# Patient Record
Sex: FEMALE | Race: BLACK OR AFRICAN AMERICAN | Employment: STUDENT | URBAN - METROPOLITAN AREA
[De-identification: names, ages, dates, MRNs, and addresses within clinical notes are randomized per-mention and may not be internally consistent; named-entity substitution may affect disease eponyms.]

---

## 2017-09-12 ENCOUNTER — ANESTHESIA EVENT (OUTPATIENT)
Dept: SURGERY | Age: 22
End: 2017-09-12
Payer: COMMERCIAL

## 2017-09-12 NOTE — DISCHARGE INSTRUCTIONS
Breast Reduction Patient Instructions    Please call the office and make an appt for Tuesday    Immediately Following Surgery:    After surgery you will rest quietly for the first 48 hours. You will be able to walk around the house and perform light daily activities; however, during this time, it is not at all unusual for you to feel some pain, soreness and pressure in the chest area. This will gradually subside and Dr. Lm Major will give you pain medication to relieve it. You must take the entire prescription of antibiotics. Be sure to lie on your back whenever you rest or sleep. Keep your head elevated for 72 hours after surgery as this will help with swelling. The surgery bra that you have been put in should be worn constantly during the day and at night. You may also wear a soft sports bra with light support instead of the surgery bra if preferred. You will then be allowed to shower two days after surgery. Brookzohra Bhagatean yourself everywhere, including the tapes and your incisions. Use mild soap and pat yourself dry and put the bra back on. This daily routine will help keep the incisions clean, and will promote wound healing. Do not submerge yourself in a bath, swimming pool, or whirlpool for two weeks. You will wear the sports bra for a total of two to three weeks after surgery. The small tape strips covering your incisions. These will remain in place for seven days and will peel off by themselves. A few patients react to the anesthetic after surgery with nausea and vomiting. This usually lasts less than 24 hours and should be treated with lots of fluids. Dr. Lm Major will prescribe nausea medicine for during your preoperative visit. The maximum swelling occurs at about three days and then begins to dramatically improve. Mild bruising typically resolves within 14 days. You should plan to be off work for 1-2 weeks, although this can vary from person to person.     Additional Post-Operative Instructions:    No heavy exercise or lifting for three weeks following surgery. For the first three days following surgery you should try to restrict your arm movements. Move your arms slowly and avoid sudden jerky movements of the chest and breast area. Keep your arms as close to your body as possible. Dr. Marielena Amor encourages walking immediately after surgery. This activity will greatly minimize the risk of blood clots in your leg veins. Do not expose your breasts to the sun for eight weeks after surgery. Please notify Dr. Marielena Amor if:   If your one breast becomes significantly larger than the other   If you develop significant bruising across the chest    If you experience a significant increase in pain in the breast after the pain has improved   If you develop a temperature above 101.5° F   If you develop redness (like a sunburn) around your incisions  If you need help or have any questions feel free to call Dr Marielena Amor with your concerns. Dr. Marielena Amor is on call 24 hours per day, seven days per week and has an answering service to forward calls. The quality of your cosmetic enhancement may be compromised if you fail to return for any scheduled post-op visits, or follow the pre- and post-operative instructions. Please dont hesitate to report any unusual or concerning changes. Our number is [phone].

## 2017-09-12 NOTE — H&P
Surgery History and Physical    Subjective:      Bouchra Menchaca is a 23 y.o. female who presents for bilateral breast reduction. Past Medical History:   Diagnosis Date    ADHD (attention deficit hyperactivity disorder)     Not medicated    Anxiety     Depression     Eczema     Ran out of her Rx    HPV vaccine counseling     Rec'd all 3 Gardasil    Molluscum contagiosum 3/2015    on biopsy, inner left thigh    Scoliosis     Screening for malignant neoplasm of the cervix     Negative per pt. @ [address] [place] [address]     No past surgical history on file. Family History   Problem Relation Age of Onset    Diabetes Paternal Uncle     Hypertension Maternal Grandmother     Heart Disease Maternal Grandmother     Diabetes Maternal Grandfather     Psychotic Disorder Father      Social History   Substance Use Topics    Smoking status: Never Smoker    Smokeless tobacco: Never Used    Alcohol use No      Comment: socially      Prior to Admission medications    Medication Sig Start Date End Date Taking? Authorizing Provider   desoximetasone (TOPICORT) 0.25 % topical cream Apply  to affected area two (2) times a day. [address] [place], [place]      Allergies   Allergen Reactions    Allegra [Fexofenadine] Vertigo       Review of Systems:  Cardiovascular: negative  Gastrointestinal: negative  Integument/Breast: negative    Objective:      No data found. Temp (24hrs), Av °F (-17.8 °C), Min:, Max:      Physical Exam:  GENERAL: alert, cooperative, no distress, appears stated age  LUNG: clear to auscultation bilaterally  HEART: regular rate and rhythm, S1, S2 normal, no murmur, click, rub or gallop  ABDOMEN: soft, non-tender. Bowel sounds normal. No masses,  no organomegaly  NEUROLOGIC: negative    Assessment: This is a 24 yo female who presents for bilateral breast reduction. Plan:     Discussed the risk of surgery including bleeding and infection,  and the risks of general anesthetic.   The patient understands the risks; any and all questions were answered to the patient's satisfaction.     Signed By: Shai Chapman PA-C     September 12, 2017

## 2017-09-13 ENCOUNTER — ANESTHESIA (OUTPATIENT)
Dept: SURGERY | Age: 22
End: 2017-09-13
Payer: COMMERCIAL

## 2017-09-13 ENCOUNTER — HOSPITAL ENCOUNTER (OUTPATIENT)
Age: 22
Setting detail: OBSERVATION
Discharge: HOME | End: 2017-09-14
Attending: SURGERY | Admitting: SURGERY
Payer: COMMERCIAL

## 2017-09-13 PROBLEM — N62 MACROMASTIA: Status: ACTIVE | Noted: 2017-09-13

## 2017-09-13 LAB — HCG UR QL: NEGATIVE

## 2017-09-13 PROCEDURE — 77030002933 HC SUT MCRYL J&J -A: Performed by: SURGERY

## 2017-09-13 PROCEDURE — 99218 HC RM OBSERVATION: CPT

## 2017-09-13 PROCEDURE — 74011000250 HC RX REV CODE- 250: Performed by: ANESTHESIOLOGY

## 2017-09-13 PROCEDURE — 74011000272 HC RX REV CODE- 272: Performed by: SURGERY

## 2017-09-13 PROCEDURE — 77030026438 HC STYL ET INTUB CARD -A: Performed by: ANESTHESIOLOGY

## 2017-09-13 PROCEDURE — 77030020255 HC SOL INJ LR 1000ML BG: Performed by: SURGERY

## 2017-09-13 PROCEDURE — 77030011640 HC PAD GRND REM COVD -A: Performed by: SURGERY

## 2017-09-13 PROCEDURE — 77030010507 HC ADH SKN DERMBND J&J -B: Performed by: SURGERY

## 2017-09-13 PROCEDURE — 74011250636 HC RX REV CODE- 250/636

## 2017-09-13 PROCEDURE — 77030020782 HC GWN BAIR PAWS FLX 3M -B

## 2017-09-13 PROCEDURE — 74011000250 HC RX REV CODE- 250

## 2017-09-13 PROCEDURE — 77030008534 HC TBNG LIPOSUC BYRO -B: Performed by: SURGERY

## 2017-09-13 PROCEDURE — 77030005538 HC CATH URETH FOL44 BARD -B: Performed by: SURGERY

## 2017-09-13 PROCEDURE — 76060000035 HC ANESTHESIA 2 TO 2.5 HR: Performed by: SURGERY

## 2017-09-13 PROCEDURE — 81025 URINE PREGNANCY TEST: CPT

## 2017-09-13 PROCEDURE — 74011250636 HC RX REV CODE- 250/636: Performed by: ANESTHESIOLOGY

## 2017-09-13 PROCEDURE — 74011250636 HC RX REV CODE- 250/636: Performed by: PHYSICIAN ASSISTANT

## 2017-09-13 PROCEDURE — 77030031139 HC SUT VCRL2 J&J -A: Performed by: SURGERY

## 2017-09-13 PROCEDURE — 88305 TISSUE EXAM BY PATHOLOGIST: CPT | Performed by: SURGERY

## 2017-09-13 PROCEDURE — 77030018836 HC SOL IRR NACL ICUM -A: Performed by: SURGERY

## 2017-09-13 PROCEDURE — 77010033678 HC OXYGEN DAILY

## 2017-09-13 PROCEDURE — 74011250636 HC RX REV CODE- 250/636: Performed by: SURGERY

## 2017-09-13 PROCEDURE — 74011000258 HC RX REV CODE- 258: Performed by: SURGERY

## 2017-09-13 PROCEDURE — 77030019908 HC STETH ESOPH SIMS -A: Performed by: ANESTHESIOLOGY

## 2017-09-13 PROCEDURE — 77030032490 HC SLV COMPR SCD KNE COVD -B: Performed by: SURGERY

## 2017-09-13 PROCEDURE — 74011250637 HC RX REV CODE- 250/637: Performed by: PHYSICIAN ASSISTANT

## 2017-09-13 PROCEDURE — 74011000250 HC RX REV CODE- 250: Performed by: SURGERY

## 2017-09-13 PROCEDURE — 77030020061 HC IV BLD WRMR ADMIN SET 3M -B: Performed by: ANESTHESIOLOGY

## 2017-09-13 PROCEDURE — 76010000131 HC OR TIME 2 TO 2.5 HR: Performed by: SURGERY

## 2017-09-13 PROCEDURE — 77030013079 HC BLNKT BAIR HGGR 3M -A: Performed by: ANESTHESIOLOGY

## 2017-09-13 PROCEDURE — 76210000000 HC OR PH I REC 2 TO 2.5 HR: Performed by: SURGERY

## 2017-09-13 PROCEDURE — 77030016570 HC BLNKT BAIR HGGR 3M -B: Performed by: SURGERY

## 2017-09-13 PROCEDURE — 77030008684 HC TU ET CUF COVD -B: Performed by: ANESTHESIOLOGY

## 2017-09-13 PROCEDURE — 77030027138 HC INCENT SPIROMETER -A

## 2017-09-13 RX ORDER — FENTANYL CITRATE 50 UG/ML
INJECTION, SOLUTION INTRAMUSCULAR; INTRAVENOUS AS NEEDED
Status: DISCONTINUED | OUTPATIENT
Start: 2017-09-13 | End: 2017-09-13 | Stop reason: HOSPADM

## 2017-09-13 RX ORDER — FLUMAZENIL 0.1 MG/ML
0.2 INJECTION INTRAVENOUS
Status: DISCONTINUED | OUTPATIENT
Start: 2017-09-13 | End: 2017-09-13 | Stop reason: HOSPADM

## 2017-09-13 RX ORDER — SODIUM CHLORIDE, SODIUM LACTATE, POTASSIUM CHLORIDE, CALCIUM CHLORIDE 600; 310; 30; 20 MG/100ML; MG/100ML; MG/100ML; MG/100ML
125 INJECTION, SOLUTION INTRAVENOUS CONTINUOUS
Status: DISCONTINUED | OUTPATIENT
Start: 2017-09-13 | End: 2017-09-13 | Stop reason: HOSPADM

## 2017-09-13 RX ORDER — ONDANSETRON 2 MG/ML
INJECTION INTRAMUSCULAR; INTRAVENOUS AS NEEDED
Status: DISCONTINUED | OUTPATIENT
Start: 2017-09-13 | End: 2017-09-13 | Stop reason: HOSPADM

## 2017-09-13 RX ORDER — ENOXAPARIN SODIUM 100 MG/ML
40 INJECTION SUBCUTANEOUS EVERY 24 HOURS
Status: DISCONTINUED | OUTPATIENT
Start: 2017-09-14 | End: 2017-09-14 | Stop reason: HOSPADM

## 2017-09-13 RX ORDER — CHOLECALCIFEROL (VITAMIN D3) 125 MCG
CAPSULE ORAL
COMMUNITY
End: 2017-09-14

## 2017-09-13 RX ORDER — CEFAZOLIN SODIUM IN 0.9 % NACL 2 G/50 ML
2 INTRAVENOUS SOLUTION, PIGGYBACK (ML) INTRAVENOUS EVERY 8 HOURS
Status: DISCONTINUED | OUTPATIENT
Start: 2017-09-13 | End: 2017-09-14 | Stop reason: HOSPADM

## 2017-09-13 RX ORDER — ROCURONIUM BROMIDE 10 MG/ML
INJECTION, SOLUTION INTRAVENOUS AS NEEDED
Status: DISCONTINUED | OUTPATIENT
Start: 2017-09-13 | End: 2017-09-13 | Stop reason: HOSPADM

## 2017-09-13 RX ORDER — ACETAMINOPHEN 325 MG/1
650 TABLET ORAL
Status: DISCONTINUED | OUTPATIENT
Start: 2017-09-13 | End: 2017-09-14 | Stop reason: HOSPADM

## 2017-09-13 RX ORDER — ALBUTEROL SULFATE 0.83 MG/ML
2.5 SOLUTION RESPIRATORY (INHALATION) ONCE
Status: COMPLETED | OUTPATIENT
Start: 2017-09-13 | End: 2017-09-13

## 2017-09-13 RX ORDER — LIDOCAINE HYDROCHLORIDE 20 MG/ML
INJECTION, SOLUTION EPIDURAL; INFILTRATION; INTRACAUDAL; PERINEURAL AS NEEDED
Status: DISCONTINUED | OUTPATIENT
Start: 2017-09-13 | End: 2017-09-13 | Stop reason: HOSPADM

## 2017-09-13 RX ORDER — DIAZEPAM 5 MG/1
5 TABLET ORAL
Status: DISCONTINUED | OUTPATIENT
Start: 2017-09-13 | End: 2017-09-14 | Stop reason: HOSPADM

## 2017-09-13 RX ORDER — DIPHENHYDRAMINE HYDROCHLORIDE 50 MG/ML
12.5 INJECTION, SOLUTION INTRAMUSCULAR; INTRAVENOUS AS NEEDED
Status: DISCONTINUED | OUTPATIENT
Start: 2017-09-13 | End: 2017-09-13 | Stop reason: HOSPADM

## 2017-09-13 RX ORDER — PROCHLORPERAZINE EDISYLATE 5 MG/ML
10 INJECTION INTRAMUSCULAR; INTRAVENOUS
Status: DISCONTINUED | OUTPATIENT
Start: 2017-09-13 | End: 2017-09-14 | Stop reason: HOSPADM

## 2017-09-13 RX ORDER — CEFAZOLIN SODIUM IN 0.9 % NACL 2 G/50 ML
2 INTRAVENOUS SOLUTION, PIGGYBACK (ML) INTRAVENOUS ONCE
Status: DISCONTINUED | OUTPATIENT
Start: 2017-09-13 | End: 2017-09-13 | Stop reason: HOSPADM

## 2017-09-13 RX ORDER — HYDROMORPHONE HYDROCHLORIDE 2 MG/ML
INJECTION, SOLUTION INTRAMUSCULAR; INTRAVENOUS; SUBCUTANEOUS AS NEEDED
Status: DISCONTINUED | OUTPATIENT
Start: 2017-09-13 | End: 2017-09-13 | Stop reason: HOSPADM

## 2017-09-13 RX ORDER — OXYCODONE AND ACETAMINOPHEN 5; 325 MG/1; MG/1
1 TABLET ORAL
Status: DISCONTINUED | OUTPATIENT
Start: 2017-09-13 | End: 2017-09-14 | Stop reason: HOSPADM

## 2017-09-13 RX ORDER — GUAIFENESIN 100 MG/5ML
81 LIQUID (ML) ORAL DAILY
COMMUNITY
End: 2017-09-14

## 2017-09-13 RX ORDER — ONDANSETRON 4 MG/1
4 TABLET, ORALLY DISINTEGRATING ORAL
Status: DISCONTINUED | OUTPATIENT
Start: 2017-09-13 | End: 2017-09-14 | Stop reason: HOSPADM

## 2017-09-13 RX ORDER — MIDAZOLAM HYDROCHLORIDE 1 MG/ML
INJECTION, SOLUTION INTRAMUSCULAR; INTRAVENOUS AS NEEDED
Status: DISCONTINUED | OUTPATIENT
Start: 2017-09-13 | End: 2017-09-13 | Stop reason: HOSPADM

## 2017-09-13 RX ORDER — SODIUM CHLORIDE 0.9 % (FLUSH) 0.9 %
5-10 SYRINGE (ML) INJECTION EVERY 8 HOURS
Status: DISCONTINUED | OUTPATIENT
Start: 2017-09-13 | End: 2017-09-14 | Stop reason: HOSPADM

## 2017-09-13 RX ORDER — DOCUSATE SODIUM 100 MG/1
100 CAPSULE, LIQUID FILLED ORAL 2 TIMES DAILY
Status: DISCONTINUED | OUTPATIENT
Start: 2017-09-13 | End: 2017-09-14 | Stop reason: HOSPADM

## 2017-09-13 RX ORDER — NALOXONE HYDROCHLORIDE 0.4 MG/ML
0.2 INJECTION, SOLUTION INTRAMUSCULAR; INTRAVENOUS; SUBCUTANEOUS
Status: DISCONTINUED | OUTPATIENT
Start: 2017-09-13 | End: 2017-09-13 | Stop reason: HOSPADM

## 2017-09-13 RX ORDER — CEFAZOLIN SODIUM 1 G/3ML
INJECTION, POWDER, FOR SOLUTION INTRAMUSCULAR; INTRAVENOUS AS NEEDED
Status: DISCONTINUED | OUTPATIENT
Start: 2017-09-13 | End: 2017-09-13 | Stop reason: HOSPADM

## 2017-09-13 RX ORDER — DEXAMETHASONE SODIUM PHOSPHATE 4 MG/ML
INJECTION, SOLUTION INTRA-ARTICULAR; INTRALESIONAL; INTRAMUSCULAR; INTRAVENOUS; SOFT TISSUE AS NEEDED
Status: DISCONTINUED | OUTPATIENT
Start: 2017-09-13 | End: 2017-09-13 | Stop reason: HOSPADM

## 2017-09-13 RX ORDER — MIDAZOLAM HYDROCHLORIDE 1 MG/ML
2 INJECTION, SOLUTION INTRAMUSCULAR; INTRAVENOUS
Status: DISCONTINUED | OUTPATIENT
Start: 2017-09-13 | End: 2017-09-13 | Stop reason: HOSPADM

## 2017-09-13 RX ORDER — DIPHENHYDRAMINE HYDROCHLORIDE 50 MG/ML
12.5 INJECTION, SOLUTION INTRAMUSCULAR; INTRAVENOUS
Status: DISCONTINUED | OUTPATIENT
Start: 2017-09-13 | End: 2017-09-14 | Stop reason: HOSPADM

## 2017-09-13 RX ORDER — PROPOFOL 10 MG/ML
INJECTION, EMULSION INTRAVENOUS AS NEEDED
Status: DISCONTINUED | OUTPATIENT
Start: 2017-09-13 | End: 2017-09-13 | Stop reason: HOSPADM

## 2017-09-13 RX ORDER — HYDROMORPHONE HCL IN 0.9% NACL 15 MG/30ML
PATIENT CONTROLLED ANALGESIA VIAL INTRAVENOUS CONTINUOUS
Status: DISCONTINUED | OUTPATIENT
Start: 2017-09-13 | End: 2017-09-14

## 2017-09-13 RX ORDER — HYDROMORPHONE HYDROCHLORIDE 1 MG/ML
.25-1 INJECTION, SOLUTION INTRAMUSCULAR; INTRAVENOUS; SUBCUTANEOUS
Status: DISCONTINUED | OUTPATIENT
Start: 2017-09-13 | End: 2017-09-13 | Stop reason: HOSPADM

## 2017-09-13 RX ORDER — SODIUM CHLORIDE AND POTASSIUM CHLORIDE .9; .15 G/100ML; G/100ML
SOLUTION INTRAVENOUS CONTINUOUS
Status: DISCONTINUED | OUTPATIENT
Start: 2017-09-13 | End: 2017-09-14

## 2017-09-13 RX ORDER — SODIUM CHLORIDE 0.9 % (FLUSH) 0.9 %
5-10 SYRINGE (ML) INJECTION AS NEEDED
Status: DISCONTINUED | OUTPATIENT
Start: 2017-09-13 | End: 2017-09-14 | Stop reason: HOSPADM

## 2017-09-13 RX ORDER — LIDOCAINE HYDROCHLORIDE 10 MG/ML
0.1 INJECTION, SOLUTION EPIDURAL; INFILTRATION; INTRACAUDAL; PERINEURAL AS NEEDED
Status: DISCONTINUED | OUTPATIENT
Start: 2017-09-13 | End: 2017-09-13 | Stop reason: HOSPADM

## 2017-09-13 RX ORDER — IBUPROFEN 200 MG
800 TABLET ORAL
COMMUNITY
End: 2017-09-14

## 2017-09-13 RX ADMIN — HYDROMORPHONE HYDROCHLORIDE 1 MG: 2 INJECTION, SOLUTION INTRAMUSCULAR; INTRAVENOUS; SUBCUTANEOUS at 11:25

## 2017-09-13 RX ADMIN — ONDANSETRON 4 MG: 2 INJECTION INTRAMUSCULAR; INTRAVENOUS at 12:08

## 2017-09-13 RX ADMIN — FENTANYL CITRATE 50 MCG: 50 INJECTION, SOLUTION INTRAMUSCULAR; INTRAVENOUS at 11:23

## 2017-09-13 RX ADMIN — Medication 10 ML: at 21:26

## 2017-09-13 RX ADMIN — FENTANYL CITRATE 50 MCG: 50 INJECTION, SOLUTION INTRAMUSCULAR; INTRAVENOUS at 11:10

## 2017-09-13 RX ADMIN — HYDROMORPHONE HYDROCHLORIDE 0.5 MG: 1 INJECTION, SOLUTION INTRAMUSCULAR; INTRAVENOUS; SUBCUTANEOUS at 12:59

## 2017-09-13 RX ADMIN — DIAZEPAM 5 MG: 5 TABLET ORAL at 21:38

## 2017-09-13 RX ADMIN — DEXAMETHASONE SODIUM PHOSPHATE 8 MG: 4 INJECTION, SOLUTION INTRA-ARTICULAR; INTRALESIONAL; INTRAMUSCULAR; INTRAVENOUS; SOFT TISSUE at 10:55

## 2017-09-13 RX ADMIN — HYDROMORPHONE HYDROCHLORIDE 0.5 MG: 1 INJECTION, SOLUTION INTRAMUSCULAR; INTRAVENOUS; SUBCUTANEOUS at 13:09

## 2017-09-13 RX ADMIN — HYDROMORPHONE HYDROCHLORIDE 0.25 MG: 1 INJECTION, SOLUTION INTRAMUSCULAR; INTRAVENOUS; SUBCUTANEOUS at 13:28

## 2017-09-13 RX ADMIN — Medication: at 23:37

## 2017-09-13 RX ADMIN — FENTANYL CITRATE 50 MCG: 50 INJECTION, SOLUTION INTRAMUSCULAR; INTRAVENOUS at 11:42

## 2017-09-13 RX ADMIN — LIDOCAINE HYDROCHLORIDE 40 MG: 20 INJECTION, SOLUTION EPIDURAL; INFILTRATION; INTRACAUDAL; PERINEURAL at 10:40

## 2017-09-13 RX ADMIN — CEFAZOLIN SODIUM 2 G: 1 INJECTION, POWDER, FOR SOLUTION INTRAMUSCULAR; INTRAVENOUS at 10:45

## 2017-09-13 RX ADMIN — MIDAZOLAM HYDROCHLORIDE 2 MG: 1 INJECTION, SOLUTION INTRAMUSCULAR; INTRAVENOUS at 10:36

## 2017-09-13 RX ADMIN — FENTANYL CITRATE 150 MCG: 50 INJECTION, SOLUTION INTRAMUSCULAR; INTRAVENOUS at 10:40

## 2017-09-13 RX ADMIN — HYDROMORPHONE HYDROCHLORIDE 0.5 MG: 2 INJECTION, SOLUTION INTRAMUSCULAR; INTRAVENOUS; SUBCUTANEOUS at 12:12

## 2017-09-13 RX ADMIN — Medication: at 13:37

## 2017-09-13 RX ADMIN — ROCURONIUM BROMIDE 30 MG: 10 INJECTION, SOLUTION INTRAVENOUS at 10:40

## 2017-09-13 RX ADMIN — CEFAZOLIN 2 G: 1 INJECTION, POWDER, FOR SOLUTION INTRAMUSCULAR; INTRAVENOUS; PARENTERAL at 18:58

## 2017-09-13 RX ADMIN — FENTANYL CITRATE 50 MCG: 50 INJECTION, SOLUTION INTRAMUSCULAR; INTRAVENOUS at 11:52

## 2017-09-13 RX ADMIN — ALBUTEROL SULFATE 2.5 MG: 2.5 SOLUTION RESPIRATORY (INHALATION) at 14:24

## 2017-09-13 RX ADMIN — SODIUM CHLORIDE AND POTASSIUM CHLORIDE: 9; 1.49 INJECTION, SOLUTION INTRAVENOUS at 13:13

## 2017-09-13 RX ADMIN — DOCUSATE SODIUM 100 MG: 100 CAPSULE ORAL at 18:12

## 2017-09-13 RX ADMIN — SODIUM CHLORIDE, SODIUM LACTATE, POTASSIUM CHLORIDE, AND CALCIUM CHLORIDE: 600; 310; 30; 20 INJECTION, SOLUTION INTRAVENOUS at 11:13

## 2017-09-13 RX ADMIN — PROPOFOL 170 MG: 10 INJECTION, EMULSION INTRAVENOUS at 10:40

## 2017-09-13 RX ADMIN — SODIUM CHLORIDE, SODIUM LACTATE, POTASSIUM CHLORIDE, AND CALCIUM CHLORIDE 125 ML/HR: 600; 310; 30; 20 INJECTION, SOLUTION INTRAVENOUS at 09:52

## 2017-09-13 RX ADMIN — HYDROMORPHONE HYDROCHLORIDE 0.5 MG: 2 INJECTION, SOLUTION INTRAMUSCULAR; INTRAVENOUS; SUBCUTANEOUS at 12:23

## 2017-09-13 RX ADMIN — MIDAZOLAM HYDROCHLORIDE 3 MG: 1 INJECTION, SOLUTION INTRAMUSCULAR; INTRAVENOUS at 10:35

## 2017-09-13 NOTE — PROGRESS NOTES
Bedside and Verbal shift change report given to Josee Barkley (oncoming nurse) by Neeraj Singh RN (offgoing nurse). Report included the following information SBAR, Kardex, Intake/Output, MAR and Recent Results.

## 2017-09-13 NOTE — PROGRESS NOTES
TRANSFER - IN REPORT:    Verbal report received from Melida Lundberg RN (name) on Jimi Escobedo  being received from PACU (unit) for routine progression of care      Report consisted of patients Situation, Background, Assessment and   Recommendations(SBAR). Information from the following report(s) SBAR, Kardex, OR Summary, Procedure Summary, MAR and Accordion was reviewed with the receiving nurse. Opportunity for questions and clarification was provided. Assessment completed upon patients arrival to unit and care assumed.          Primary Nurse Jimbo Guerra RN and Melida Lundberg RN performed a dual skin assessment on this patient Impairment noted- see wound doc flow sheet  Rashad score is 23

## 2017-09-13 NOTE — ANESTHESIA PREPROCEDURE EVALUATION
Anesthetic History   No history of anesthetic complications            Review of Systems / Medical History  Patient summary reviewed, nursing notes reviewed and pertinent labs reviewed    Pulmonary  Within defined limits                 Neuro/Psych   Within defined limits           Cardiovascular  Within defined limits                Exercise tolerance: >4 METS     GI/Hepatic/Renal  Within defined limits              Endo/Other  Within defined limits           Other Findings   Comments: Scoliosis  Eczema  Anxiety/depression           Physical Exam    Airway  Mallampati: II    Neck ROM: normal range of motion   Mouth opening: Normal     Cardiovascular  Regular rate and rhythm,  S1 and S2 normal,  no murmur, click, rub, or gallop  Rhythm: regular  Rate: normal         Dental  No notable dental hx       Pulmonary  Breath sounds clear to auscultation               Abdominal  GI exam deferred       Other Findings            Anesthetic Plan    ASA: 2  Anesthesia type: general          Induction: Intravenous  Anesthetic plan and risks discussed with: Patient

## 2017-09-13 NOTE — H&P
Date of Surgery Update:  Marek Perez was seen and examined. History and physical has been reviewed. The patient has been examined.  There have been no significant clinical changes since the completion of the originally dated History and Physical.    Signed By: Shayy Huynh MD     September 13, 2017 10:00 AM

## 2017-09-13 NOTE — PERIOP NOTES
TRANSFER - OUT REPORT:    Verbal report given to BRITTANY Su(name) on Yamileth Jameson  being transferred to [facility](unit) for routine post - op       Report consisted of patients Situation, Background, Assessment and   Recommendations(SBAR). Information from the following report(s) SBAR, OR Summary, Procedure Summary, Intake/Output and Cardiac Rhythm NSR was reviewed with the receiving nurse. Opportunity for questions and clarification was provided. Patient transported with:   Registered Nurse     RN and family at bedside.

## 2017-09-13 NOTE — PROGRESS NOTES
Problem: Falls - Risk of  Goal: *Absence of Falls  Document Miguel Fall Risk and appropriate interventions in the flowsheet.    Outcome: Progressing Towards Goal  Fall Risk Interventions:  Mobility Interventions: Patient to call before getting OOB     Mentation Interventions: Adequate sleep, hydration, pain control     Medication Interventions: Bed/chair exit alarm, Patient to call before getting OOB, Teach patient to arise slowly     Elimination Interventions: Call light in reach

## 2017-09-13 NOTE — IP AVS SNAPSHOT
Chris Elders 
 
 
 [address] [facility] 
[phone] Patient: Lindsay Jackson MRN: [id number] WNN:[birth year] You are allergic to the following Allergen Reactions Allegra (Fexofenadine) Itching Vertigo Recent Documentation Height Weight BMI OB Status Smoking Status 1.676 m 80.9 kg 28.79 kg/m2 Having regular periods Never Smoker Emergency Contacts Name Discharge Info Relation Home Work Mobile [address] CAREGIVER [3] Parent [1] [phone] Karissa Mile CAREGIVER [3] Sister [23] [phone] About your hospitalization You were admitted on:  September 13, 2017 You last received care in the:  [facility] 5M1 MED SURG 1 You were discharged on:  September 14, 2017 Unit phone number:  [phone] Why you were hospitalized Your primary diagnosis was:  Not on File Your diagnoses also included:  Macromastia Providers Seen During Your Hospitalizations Provider Role Specialty Primary office phone Darling Bui MD Attending Provider Plastic Surgery [phone] Your Primary Care Physician (PCP) Primary Care Physician Office Phone Office Fax [phone] [phone] Follow-up Information Follow up With Details Comments Contact Info Michael Carballo MD   [facility] [address] [facility] 
[phone] Current Discharge Medication List  
  
START taking these medications Dose & Instructions Dispensing Information Comments Morning Noon Evening Bedtime  
 oxyCODONE-acetaminophen 5-325 mg per tablet Commonly known as:  PERCOCET Your last dose was: Your next dose is:    
   
   
 Dose:  1 Tab Take 1 Tab by mouth every four (4) hours as needed. Max Daily Amount: 6 Tabs. Quantity:  45 Tab Refills:  0 STOP taking these medications   
 aspirin 81 mg chewable tablet  
   
  
 desoximetasone 0.25 % topical cream  
Commonly known as:  TOPICORT  
   
  
 ibuprofen 200 mg tablet Commonly known as:  MOTRIN  
   
  
 melatonin Tab tablet Where to Get Your Medications Information on where to get these meds will be given to you by the nurse or doctor. ! Ask your nurse or doctor about these medications  
  oxyCODONE-acetaminophen 5-325 mg per tablet Discharge Instructions Breast Reduction Patient Instructions Please call the office and make an appt for Tuesday Immediately Following Surgery: After surgery you will rest quietly for the first 48 hours. You will be able to walk around the house and perform light daily activities; however, during this time, it is not at all unusual for you to feel some pain, soreness and pressure in the chest area. This will gradually subside and Dr. Lm Major will give you pain medication to relieve it. You must take the entire prescription of antibiotics. Be sure to lie on your back whenever you rest or sleep. Keep your head elevated for 72 hours after surgery as this will help with swelling. The surgery bra that you have been put in should be worn constantly during the day and at night. You may also wear a soft sports bra with light support instead of the surgery bra if preferred. You will then be allowed to shower two days after surgery. Brook Loco yourself everywhere, including the tapes and your incisions. Use mild soap and pat yourself dry and put the bra back on. This daily routine will help keep the incisions clean, and will promote wound healing. Do not submerge yourself in a bath, swimming pool, or whirlpool for two weeks. You will wear the sports bra for a total of two to three weeks after surgery. The small tape strips covering your incisions.   These will remain in place for seven days and will peel off by themselves. A few patients react to the anesthetic after surgery with nausea and vomiting. This usually lasts less than 24 hours and should be treated with lots of fluids. Dr. Yamil Mcneal will prescribe nausea medicine for during your preoperative visit. The maximum swelling occurs at about three days and then begins to dramatically improve. Mild bruising typically resolves within 14 days. You should plan to be off work for 1-2 weeks, although this can vary from person to person. Additional Post-Operative Instructions: No heavy exercise or lifting for three weeks following surgery. For the first three days following surgery you should try to restrict your arm movements. Move your arms slowly and avoid sudden jerky movements of the chest and breast area. Keep your arms as close to your body as possible. Dr. Yamil Mcneal encourages walking immediately after surgery. This activity will greatly minimize the risk of blood clots in your leg veins. Do not expose your breasts to the sun for eight weeks after surgery. Please notify Dr. Yamil Mcneal if: 
? If your one breast becomes significantly larger than the other ? If you develop significant bruising across the chest  
? If you experience a significant increase in pain in the breast after the pain has improved ? If you develop a temperature above 101.5° F 
? If you develop redness (like a sunburn) around your incisions If you need help or have any questions feel free to call Dr Yamil Mcneal with your concerns. Dr. Yamil Mcneal is on call 24 hours per day, seven days per week and has an answering service to forward calls. The quality of your cosmetic enhancement may be compromised if you fail to return for any scheduled post-op visits, or follow the pre- and post-operative instructions. Please dont hesitate to report any unusual or concerning changes. Our number is [phone]. Discharge Orders None Introducing [address]! Dear Travis Mock: Thank you for requesting a [employer] account. Our records indicate that you already have an active [employer] account. You can access your account anytime at [email or web address] Did you know that you can access your hospital and ER discharge instructions at any time in [employer]? You can also review all of your test results from your hospital stay or ER visit. Additional Information If you have questions, please visit the Frequently Asked Questions section of the [employer] website at [email or web address] Remember, [employer] is NOT to be used for urgent needs. For medical emergencies, dial 911. Now available from your iPhone and Android! General Information Please provide this summary of care documentation to your next provider. Patient Signature:  ____________________________________________________________ Date:  ____________________________________________________________  
  
Rasta Hansen Provider Signature:  ____________________________________________________________ Date:  ____________________________________________________________

## 2017-09-13 NOTE — BRIEF OP NOTE
BRIEF OPERATIVE NOTE    Date of Procedure: 9/13/2017   Preoperative Diagnosis: MACROMASTIA  Postoperative Diagnosis: MACROMASTIA    Procedure(s):  BILATERAL BREAST REDUCTION POSSIBLE FREE NIPPLE GRAFT  Surgeon(s) and Role:     * Herber Feliciano MD - Primary         Assistant Staff:  Physician Assistant: Adilson Rehman PA-C    Surgical Staff:  Circ-1: José Miguel Mejia RN  Circ-2: Rachele Steiner RN  Circ-Relief: Nora Calix RN  Physician Assistant: Adilson Rehman PA-C  Scrub Tech-1: Malika Neal  Scrub Tech-Relief: Gene Lout  Float Staff: Candis Bassett RN  Event Time In   Incision Start 1111   Incision Close 1228     Anesthesia: General   Estimated Blood Loss: 25  Specimens:   ID Type Source Tests Collected by Time Destination   1 : right breast tissue Preservative Breast  Herber Feliciano MD 9/13/2017 1125 Pathology   2 : left breast tissue Preservative Breast  Herber Feliciano MD 9/13/2017 1147 Pathology      Findings: 0   Complications: 0  Implants: * No implants in log *

## 2017-09-13 NOTE — PROGRESS NOTES
CM Note:  Met with pt for d/c planning. PTA pt was independent with ADL's, was driving and ambulatory. No DME at home. She has never had home health. Her emergency contact is her mother, Aye Squires ([phone]), who will drive her home at d/c. Pt has Rx coverage and gets her medications from [facility] on [address]. No anticipated needs for d/c. Home when medically stable. BRITTANY Ordonez     Care Management Interventions  PCP Verified by CM:  Yes (PCP is Dr. Fareed Ruiz (Criteria: CHF and RRAT>21): No  MyChart Signup: No  Discharge Durable Medical Equipment: No  Physical Therapy Consult: No  Occupational Therapy Consult: No  Speech Therapy Consult: No  Current Support Network: Relative's Home (Pt lives with her mother and sister in a 2 story house with 15 entry steps and 15 steps to second floor.)  Confirm Follow Up Transport: Family (Pt's mother to drive her home at d/c.)  Plan discussed with Pt/Family/Caregiver: Yes  Discharge Location  Discharge Placement: Home with two level

## 2017-09-13 NOTE — ANESTHESIA POSTPROCEDURE EVALUATION
Post-Anesthesia Evaluation and Assessment    Patient: Mis Dean MRN: [id number]  SSN: [id number]    YOB: 1995  Age: 23 y.o. Sex: female       Cardiovascular Function/Vital Signs  Visit Vitals    /72    Pulse 83    Temp 37 °C (98.6 °F)    Resp (!) 0    Ht 5' 6\" (1.676 m)    Wt 80.9 kg (178 lb 5.6 oz)    SpO2 100%    BMI 28.79 kg/m2       Patient is status post general anesthesia for Procedure(s):  BILATERAL BREAST REDUCTION POSSIBLE FREE NIPPLE GRAFT. Nausea/Vomiting: None    Postoperative hydration reviewed and adequate. Pain:  Pain Scale 1: Numeric (0 - 10) (09/13/17 1245)  Pain Intensity 1: 2 (09/13/17 1245)   Managed    Neurological Status:   Neuro (WDL): Exceptions to WDL (09/13/17 1245)  Neuro  Neurologic State: Drowsy; Eyes open to voice (09/13/17 1245)  Orientation Level: Oriented to person;Oriented to place;Oriented to situation (09/13/17 1245)  Cognition: Follows commands (09/13/17 1245)  Speech: Clear (09/13/17 1245)  LUE Motor Response: Moderate (09/13/17 1245)  LLE Motor Response: Moderate (09/13/17 1245)  RUE Motor Response: Moderate (09/13/17 1245)  RLE Motor Response: Moderate (09/13/17 1245)   At baseline    Mental Status and Level of Consciousness: Arousable    Pulmonary Status:   O2 Device: Nasal cannula (09/13/17 1246)   Adequate oxygenation and airway patent    Complications related to anesthesia: None    Post-anesthesia assessment completed.  No concerns    Signed By: Melody De Dios MD     September 13, 2017

## 2017-09-14 VITALS
SYSTOLIC BLOOD PRESSURE: 115 MMHG | OXYGEN SATURATION: 100 % | DIASTOLIC BLOOD PRESSURE: 62 MMHG | HEIGHT: 66 IN | RESPIRATION RATE: 17 BRPM | TEMPERATURE: 98.5 F | WEIGHT: 178.35 LBS | BODY MASS INDEX: 28.66 KG/M2 | HEART RATE: 98 BPM

## 2017-09-14 PROCEDURE — 74011250636 HC RX REV CODE- 250/636: Performed by: PHYSICIAN ASSISTANT

## 2017-09-14 PROCEDURE — 77010033678 HC OXYGEN DAILY

## 2017-09-14 PROCEDURE — 74011250637 HC RX REV CODE- 250/637: Performed by: PHYSICIAN ASSISTANT

## 2017-09-14 PROCEDURE — 99218 HC RM OBSERVATION: CPT

## 2017-09-14 RX ORDER — HYDROMORPHONE HYDROCHLORIDE 2 MG/1
2 TABLET ORAL
Status: DISCONTINUED | OUTPATIENT
Start: 2017-09-14 | End: 2017-09-14 | Stop reason: HOSPADM

## 2017-09-14 RX ORDER — OXYCODONE AND ACETAMINOPHEN 5; 325 MG/1; MG/1
1 TABLET ORAL
Qty: 45 TAB | Refills: 0 | Status: SHIPPED | OUTPATIENT
Start: 2017-09-14 | End: 2018-01-01

## 2017-09-14 RX ADMIN — OXYCODONE HYDROCHLORIDE AND ACETAMINOPHEN 1 TABLET: 5; 325 TABLET ORAL at 01:03

## 2017-09-14 RX ADMIN — OXYCODONE HYDROCHLORIDE AND ACETAMINOPHEN 1 TABLET: 5; 325 TABLET ORAL at 16:16

## 2017-09-14 RX ADMIN — CEFAZOLIN 2 G: 1 INJECTION, POWDER, FOR SOLUTION INTRAMUSCULAR; INTRAVENOUS; PARENTERAL at 02:39

## 2017-09-14 RX ADMIN — DIAZEPAM 5 MG: 5 TABLET ORAL at 03:49

## 2017-09-14 RX ADMIN — DIAZEPAM 5 MG: 5 TABLET ORAL at 13:44

## 2017-09-14 RX ADMIN — DOCUSATE SODIUM 100 MG: 100 CAPSULE ORAL at 10:55

## 2017-09-14 RX ADMIN — OXYCODONE HYDROCHLORIDE AND ACETAMINOPHEN 1 TABLET: 5; 325 TABLET ORAL at 07:08

## 2017-09-14 RX ADMIN — CEFAZOLIN 2 G: 1 INJECTION, POWDER, FOR SOLUTION INTRAMUSCULAR; INTRAVENOUS; PARENTERAL at 12:13

## 2017-09-14 RX ADMIN — OXYCODONE HYDROCHLORIDE AND ACETAMINOPHEN 1 TABLET: 5; 325 TABLET ORAL at 10:55

## 2017-09-14 RX ADMIN — ENOXAPARIN SODIUM 40 MG: 40 INJECTION SUBCUTANEOUS at 07:07

## 2017-09-14 RX ADMIN — SODIUM CHLORIDE AND POTASSIUM CHLORIDE: 9; 1.49 INJECTION, SOLUTION INTRAVENOUS at 00:15

## 2017-09-14 NOTE — PROGRESS NOTES
Pharmacist Discharge Medication Reconciliation    Discharge Provider:  Neeru Perez       Discharge Medications:      Current Discharge Medication List        START taking these medications         Dose & Instructions Dispensing Information Comments   Morning Noon Evening Bedtime      oxyCODONE-acetaminophen 5-325 mg per tablet   Commonly known as:  PERCOCET       Your last dose was: Your next dose is:              Dose:  1 Tab   Take 1 Tab by mouth every four (4) hours as needed. Max Daily Amount: 6 Tabs. Quantity:  45 Tab   Refills:  0                               STOP taking these medications              aspirin 81 mg chewable tablet           desoximetasone 0.25 % topical cream   Commonly known as:  TOPICORT           ibuprofen 200 mg tablet   Commonly known as:  MOTRIN           melatonin Tab tablet                    Where to Get Your Medications        Information on where to get these meds will be given to you by the nurse or doctor. !  Ask your nurse or doctor about these medications     oxyCODONE-acetaminophen 5-325 mg per tablet              Medication prescriptions and instructions were given to patient at a pre-op appt     The patient's chart, MAR, and AVS were reviewed by   Faith Wilkerson PHARMD, BCPS  Contact: [phone]

## 2017-09-14 NOTE — PROGRESS NOTES
POD #1  Patient doing well. Pain controlled with PCA. Tolerating liquid diet. VSS. Afebrile  Breasts soft bilaterally. Incisions clean, dry, and intact. Nipples perfusing well, sensation intact bilaterally.     1) D/C when ready  2) Advance diet  3) OOB  4) D/C PCA

## 2017-09-14 NOTE — PROGRESS NOTES
Problem: Falls - Risk of  Goal: *Absence of Falls  Document Miguel Fall Risk and appropriate interventions in the flowsheet. Outcome: Progressing Towards Goal  Fall Risk Interventions:  Mobility Interventions: Patient to call before getting OOB     Mentation Interventions: Adequate sleep, hydration, pain control     Medication Interventions: Patient to call before getting OOB, Teach patient to arise slowly     Elimination Interventions: Call light in reach, Patient to call for help with toileting needs                 Bedside shift change report given to RAAD RN (oncoming nurse) by Katlin Albarran RN (offgoing nurse). Report included the following information SBAR, Kardex, Intake/Output and MAR.

## 2017-09-14 NOTE — PROGRESS NOTES
9:50 PM  Called into pt's room, pt stated she felt like it was hard to breath. 02 at 99% @ 2L, increased oxygen to 3L for O2 @ 100%. Pt breathing unlabored. PRN 5mg PO Valium given. Pt stated she was cold; temperature in room set at 70 degrees, increased temp and pt given 2 blankets. 10:31 PM  Pt resting comfortably in room talking on phone, denies difficulty breathing. 10:45 PM  Pt still resting comfortably in room with unlabored breathing. 12:03 AM  Bedside and Verbal shift change report given to [address] [place] [address] (oncoming nurse) by Yuli Padilla (offgoing nurse). Report included the following information SBAR, MAR and Recent Results.

## 2017-09-18 NOTE — OP NOTES
[facility]   [facility], [address]   OP NOTE       Name:  Hodan Lara   MR#:  [id number]   :  1995   Account #:  [de-identified]    Surgery Date:  2017   Date of Adm:  2017       PREOPERATIVE DIAGNOSIS: Chronic back pain, shoulder pain, neck   pain, inframammary intertrigo and severe bra strap grooving secondary   to macromastia. POSTOPERATIVE DIAGNOSIS: Chronic back pain, shoulder pain,   neck pain, inframammary intertrigo and severe bra strap grooving   secondary to macromastia. PROCEDURES PERFORMED    1. Bilateral breast reduction. 2. Creation inferior pedicle fasciocutaneous flap, right and left breast.     ATTENDING SURGEON: MD Mony Mcknight MD     ASSISTANT: Henrique Jones PA-C. Due to the complexity of this   procedure, surgical assistance was required. ANESTHESIA: General endotracheal.    ESTIMATED BLOOD LOSS: 25 mL. DRAINS: None. SPECIMENS REMOVED   1. Total excised tissue right breast, 514 grams. 2. Total excised tissue left breast, 492 grams. COMPLICATIONS: None. COUNTS: Correct x2. DISPOSITION: Stable to PACU.    BRIEF HISTORY: The patient is a 58-year-old female with a long-term   history of chronic back pain, shoulder pain, neck pain, inframammary   intertrigo and bra strap grooving secondary to macromastia. She now   presents for breast reduction. Bilateral breast reduction via a Wise   pattern skin approach with superior medial pedicle nipple transposition   and possible free nipple grafting is offered. The overall procedure,   incisional approach, expected outcomes and recovery were discussed.    The risks, benefits and alternatives to the procedure including, but not   limited to bleeding, infection, damage to surrounding tissue structures,   the need for revisional surgery, seroma, hematoma, skin flap loss, fat   necrosis, partial or total loss of the nipple, partial or total loss of   sensation to the nipple, inability to breast-feed, hypertrophic scarring   and asymmetry were discussed. Postoperative cup size cannot be   guaranteed. She agreed to proceed. DESCRIPTION OF PROCEDURE: Preoperative markings were made   with the patient in the standing position. Informed consent was   obtained. The patient was taken to the operating room and placed   supine on the operating table. Sequential compression boots were   placed. General endotracheal anesthesia was obtained. A lower body   Yon hugger and Roberts catheter were placed. The chest was prepped   and draped in the usual sterile fashion. The preoperative markings   were injected with 40 mL of 0.25% lidocaine with 1:400,000   epinephrine. Bilateral circumareolar incisions were designed with the   nipple on moderate stretch using a 38 mm cookie cutter. Bilateral   superior medial pedicles were designed 6 cm in length and 5 cm wide. Bilateral inferior pedicles were designed 5 cm in length and 6 cm wide. These were designed along the meridian at the level of the   inframammary fold, and were designed to improve lower pole contour   and to reduce the dead space at the triple point closure. Both breasts   were then infused with 500 mL of standard tumescent solution   consisting of 1 L of warmed lactated Ringer's mixed with 1 ampule of   epinephrine and 10 mL of 2% lidocaine plain using a Lamis infiltrating   cannula. This was done to reduce intraoperative blood loss, and to   improve dissection using the PlasmaBlade. The right circumareolar   incision was made sharply. The vertical, horizontal and inframammary   incisions were made. The superior medial pedicle and inferior pedicle   were then incised and de-epithelialized. The inferior pedicle was   dissected along its medial, lateral and inferior aspects directly down to   the anterior chest wall fascia, capturing underlying pectoral perforators.    The superior medial pedicle was then dissected along its medial,   lateral and inferior aspects directly down to the anterior chest wall   fascia. The vertical and horizontal incisions were then deepened with   the PlasmaBlade, directly down to the anterior chest wall fascia, raising   a superiorly-based fasciocutaneous breast flap. The medial and lateral   aspects of the inframammary incision were then deepened with   electrocautery, and the medial, central and lateral breast wedges were   then removed en bloc with the PlasmaBlade and passed off the   operating table for pathology. The right side weighed 514 grams. Hemostasis was secured throughout with 2-0 Vicryl stick ties and   electrocautery. The superior fasciocutaneous breast flap was then   transposed to its new anatomic position and tailor-tacked closed with a   0 Prolene key stitch and surgical staples. The same procedure was   repeated on the left, for a total resection of 492 grams. Symmetry and   volume were satisfactory. The breasts were then reopened and   irrigated with 2 L of triple antibiotic solution. Hemostasis was secured. A 2 x 2 cm triangular skin dermal flap was created along the   inframammary fold at the meridian. The breast was then reclosed, and   this triangular skin dermal flap was inset into the vertical incision in a   V-Y pattern to reduce tension at the skin closure. The inframammary   incision was then closed with running subcutaneous and deep dermal   2-0 Monocryl, followed by a running subcuticular 3-0 Monocryl on the   skin. The vertical incision was then closed with interrupted   subcutaneous and deep dermal 3-0 Monocryl. The nipple-areolar   complexes were then brought out through new apertures 7.5 cm   superior to the inframammary fold along the meridian. These were   marked with a 38 mm cookie cutter, incised and de-epithelialized.  The   nipple-areolar complexes were then brought out through these new   apertures in proper orientation without tension, and inset with   interrupted deep dermal 3-0 Monocryl. The vertical and circumareolar   incisions were then closed with running subcuticular 3-0 Monocryl on   the skin. The nipple and skin flaps were perfusing well at the end of the   case to light touch and pinprick. The wounds were then dressed with   Dermabond, 4 x 4's and Medipore tape. A surgical bra was placed. Anesthesia was then discontinued. The patient extubated in the   operating room, having tolerated the procedure well. The needle,   instrument and laparotomy pad counts at the end of the case were   correct.         Juan Hughes MD      [facility]   D:  09/18/2017   10:28   T:  09/18/2017   10:52   Job #:  [id number]

## 2017-09-23 ENCOUNTER — HOSPITAL ENCOUNTER (EMERGENCY)
Age: 22
Discharge: HOME | End: 2017-09-23
Attending: EMERGENCY MEDICINE
Payer: COMMERCIAL

## 2017-09-23 VITALS
WEIGHT: 178 LBS | RESPIRATION RATE: 16 BRPM | OXYGEN SATURATION: 98 % | HEIGHT: 66 IN | SYSTOLIC BLOOD PRESSURE: 155 MMHG | HEART RATE: 89 BPM | DIASTOLIC BLOOD PRESSURE: 57 MMHG | TEMPERATURE: 98 F | BODY MASS INDEX: 28.61 KG/M2

## 2017-09-23 DIAGNOSIS — K59.03 DRUG-INDUCED CONSTIPATION: Primary | ICD-10-CM

## 2017-09-23 LAB
ABO + RH BLD: NORMAL
ALBUMIN SERPL-MCNC: 3.7 G/DL (ref 3.5–5)
ALBUMIN/GLOB SERPL: 1 {RATIO} (ref 1.1–2.2)
ALP SERPL-CCNC: 83 U/L (ref 45–117)
ALT SERPL-CCNC: 78 U/L (ref 12–78)
ANION GAP SERPL CALC-SCNC: 7 MMOL/L (ref 5–15)
AST SERPL-CCNC: 50 U/L (ref 15–37)
BASOPHILS # BLD: 0 K/UL (ref 0–0.1)
BASOPHILS NFR BLD: 0 % (ref 0–1)
BILIRUB SERPL-MCNC: 0.2 MG/DL (ref 0.2–1)
BLOOD GROUP ANTIBODIES SERPL: NORMAL
BUN SERPL-MCNC: 9 MG/DL (ref 6–20)
BUN/CREAT SERPL: 12 (ref 12–20)
CALCIUM SERPL-MCNC: 8.7 MG/DL (ref 8.5–10.1)
CHLORIDE SERPL-SCNC: 101 MMOL/L (ref 97–108)
CO2 SERPL-SCNC: 28 MMOL/L (ref 21–32)
CREAT SERPL-MCNC: 0.78 MG/DL (ref 0.55–1.02)
EOSINOPHIL # BLD: 0.2 K/UL (ref 0–0.4)
EOSINOPHIL NFR BLD: 2 % (ref 0–7)
ERYTHROCYTE [DISTWIDTH] IN BLOOD BY AUTOMATED COUNT: 14.3 % (ref 11.5–14.5)
GLOBULIN SER CALC-MCNC: 3.7 G/DL (ref 2–4)
GLUCOSE SERPL-MCNC: 89 MG/DL (ref 65–100)
HCT VFR BLD AUTO: 37.2 % (ref 35–47)
HEMOCCULT STL QL: POSITIVE
HGB BLD-MCNC: 12.4 G/DL (ref 11.5–16)
LIPASE SERPL-CCNC: 78 U/L (ref 73–393)
LYMPHOCYTES # BLD: 1.9 K/UL (ref 0.8–3.5)
LYMPHOCYTES NFR BLD: 16 % (ref 12–49)
MCH RBC QN AUTO: 32.3 PG (ref 26–34)
MCHC RBC AUTO-ENTMCNC: 33.3 G/DL (ref 30–36.5)
MCV RBC AUTO: 96.9 FL (ref 80–99)
MONOCYTES # BLD: 1 K/UL (ref 0–1)
MONOCYTES NFR BLD: 8 % (ref 5–13)
NEUTS SEG # BLD: 9.1 K/UL (ref 1.8–8)
NEUTS SEG NFR BLD: 74 % (ref 32–75)
PLATELET # BLD AUTO: 462 K/UL (ref 150–400)
POTASSIUM SERPL-SCNC: 3.6 MMOL/L (ref 3.5–5.1)
PROT SERPL-MCNC: 7.4 G/DL (ref 6.4–8.2)
RBC # BLD AUTO: 3.84 M/UL (ref 3.8–5.2)
SODIUM SERPL-SCNC: 136 MMOL/L (ref 136–145)
SPECIMEN EXP DATE BLD: NORMAL
WBC # BLD AUTO: 12.3 K/UL (ref 3.6–11)

## 2017-09-23 PROCEDURE — 85025 COMPLETE CBC W/AUTO DIFF WBC: CPT | Performed by: NURSE PRACTITIONER

## 2017-09-23 PROCEDURE — 83690 ASSAY OF LIPASE: CPT | Performed by: NURSE PRACTITIONER

## 2017-09-23 PROCEDURE — 86900 BLOOD TYPING SEROLOGIC ABO: CPT | Performed by: NURSE PRACTITIONER

## 2017-09-23 PROCEDURE — 80053 COMPREHEN METABOLIC PANEL: CPT | Performed by: NURSE PRACTITIONER

## 2017-09-23 PROCEDURE — 82272 OCCULT BLD FECES 1-3 TESTS: CPT | Performed by: NURSE PRACTITIONER

## 2017-09-23 PROCEDURE — 36415 COLL VENOUS BLD VENIPUNCTURE: CPT | Performed by: NURSE PRACTITIONER

## 2017-09-23 PROCEDURE — 99283 EMERGENCY DEPT VISIT LOW MDM: CPT

## 2017-09-23 RX ORDER — POLYETHYLENE GLYCOL 3350 17 G/17G
17 POWDER, FOR SOLUTION ORAL DAILY
Qty: 510 G | Refills: 0 | Status: SHIPPED | OUTPATIENT
Start: 2017-09-23 | End: 2018-12-14

## 2017-09-23 NOTE — ED TRIAGE NOTES
Abdominal pain, rectal bleeding after taking laxative and suppositories. S/P recent breast augmentation and had been on Lovenox. Had one episode of bright red bleeding that filled the toilet bowl this morning.

## 2017-09-23 NOTE — ED NOTES
Pt ambulated to restroom to attempt to give urine sample; unsuccessful. Pt tearful because \"it hurts too much to push\". Some stool observed on toilet paper, but patient did not want to continue to try. NP notified; orders received.

## 2017-09-23 NOTE — DISCHARGE INSTRUCTIONS
Constipation: Care Instructions  Your Care Instructions  Constipation means that you have a hard time passing stools (bowel movements). People pass stools from 3 times a day to once every 3 days. What is normal for you may be different. Constipation may occur with pain in the rectum and cramping. The pain may get worse when you try to pass stools. Sometimes there are small amounts of bright red blood on toilet paper or the surface of stools. This is because of enlarged veins near the rectum (hemorrhoids). A few changes in your diet and lifestyle may help you avoid ongoing constipation. Your doctor may also prescribe medicine to help loosen your stool. Some medicines can cause constipation. These include pain medicines and antidepressants. Tell your doctor about all the medicines you take. Your doctor may want to make a medicine change to ease your symptoms. Follow-up care is a key part of your treatment and safety. Be sure to make and go to all appointments, and call your doctor if you are having problems. It's also a good idea to know your test results and keep a list of the medicines you take. How can you care for yourself at home? · Drink plenty of fluids, enough so that your urine is light yellow or clear like water. If you have kidney, heart, or liver disease and have to limit fluids, talk with your doctor before you increase the amount of fluids you drink. · Include high-fiber foods in your diet each day. These include fruits, vegetables, beans, and whole grains. · Get at least 30 minutes of exercise on most days of the week. Walking is a good choice. You also may want to do other activities, such as running, swimming, cycling, or playing tennis or team sports. · Take a fiber supplement, such as Citrucel or Metamucil, every day. Read and follow all instructions on the label. · Schedule time each day for a bowel movement. A daily routine may help.  Take your time having your bowel movement. · Support your feet with a small step stool when you sit on the toilet. This helps flex your hips and places your pelvis in a squatting position. · Your doctor may recommend an over-the-counter laxative to relieve your constipation. Examples are Milk of Magnesia and MiraLax. Read and follow all instructions on the label. Do not use laxatives on a long-term basis. When should you call for help? Call your doctor now or seek immediate medical care if:  · You have new or worse belly pain. · You have new or worse nausea or vomiting. · You have blood in your stools. Watch closely for changes in your health, and be sure to contact your doctor if:  · Your constipation is getting worse. · You do not get better as expected. Where can you learn more? Go to http://pino-dorinda.info/. Enter 21 [phone] in the search box to learn more about \"Constipation: Care Instructions. \"  Current as of: March 20, 2017  Content Version: 11.3  © 9201-7212 [employer]. Care instructions adapted under license by [employer] (which disclaims liability or warranty for this information). If you have questions about a medical condition or this instruction, always ask your healthcare professional. [place] [address] any warranty or liability for your use of this information.

## 2017-09-23 NOTE — ED PROVIDER NOTES
HPI Comments: 23 y.o. female with past medical history significant for screening for malignant neoplasm of the cervix, HPV vaccine counseling, depression, anxiety, ADHD, eczema, scoliosis and molluscum contagiosum who presents from home via EMS with chief complaint of rectal bleeding. The pt reports having an operation for breast reduction 1x week ago. She states she was sent  home on Lovenox  for 5 days and over those five days the pt reports develping constipation. Per pt, she attempted to use her finger to try to manually  Disimpact herself yesterday (9/22/2017) which has caused anal bleeding since. The pt reports waking up this morning with evidence of  blood in her underwear. Following using the restroom this morning, the pt adds that there was blood present in the toilet bowel. The pt complains of moderate cramping like abd pain along with her rectal bleeding. The pt notes she has no hx of hemorrhoids or rectal bleeding in past. She rates her pain 5/10. The pt denies  dizziness, SOB and CP. There are no other acute medical concerns at this time. Social hx: Non-smoker, Current social ETOH use  PCP: Benny Davis MD  Past Medical History:  No date: ADHD (attention deficit hyperactivity disorder)      Comment: Not medicated  No date: Anxiety  No date: Depression  No date: Eczema      Comment: Ran out of her Rx  No date: HPV vaccine counseling      Comment: Rec'd all 3 Gardasil  3/2015:  Molluscum contagiosum      Comment: on biopsy, inner left thigh  No date: Scoliosis  2013: Screening for malignant neoplasm of the cervix      Comment: Negative per pt. @ [address] [place] [address]    Past Surgical History:  9/13/2017: HX BREAST REDUCTION Bilateral      Comment: BILATERAL BREAST REDUCTION POSSIBLE FREE                NIPPLE GRAFT performed by Sherryle Check, MD at                [facility] MAIN OR      Note written by Leslie Villatoro, as dictated by Camacho Mills NP 4:01 PM          The history is provided by the patient and the EMS personnel. Past Medical History:   Diagnosis Date    ADHD (attention deficit hyperactivity disorder)     Not medicated    Anxiety     Depression     Eczema     Ran out of her Rx    HPV vaccine counseling     Rec'd all 3 Gardasil    Molluscum contagiosum 3/2015    on biopsy, inner left thigh    Scoliosis     Screening for malignant neoplasm of the cervix 2013    Negative per pt. @ [facility]       Past Surgical History:   Procedure Laterality Date    HX BREAST REDUCTION Bilateral 9/13/2017    BILATERAL BREAST REDUCTION POSSIBLE FREE NIPPLE GRAFT performed by Pita Rooney MD at [facility] in [place] MAIN OR         Family History:   Problem Relation Age of Onset    Diabetes Paternal Uncle     Psychotic Disorder Father     Hypertension Maternal Grandmother     Heart Disease Maternal Grandmother     Diabetes Maternal Grandfather        Social History     Social History    Marital status: SINGLE     Spouse name: N/A    Number of children: N/A    Years of education: N/A     Occupational History    Not on file. Social History Main Topics    Smoking status: Never Smoker    Smokeless tobacco: Never Used    Alcohol use Yes      Comment: socially    Drug use: No    Sexual activity: Not Currently     Partners: Male     Birth control/ protection: Condom     Other Topics Concern    Not on file     Social History Narrative         ALLERGIES: Allegra [fexofenadine]    Review of Systems   Constitutional: Negative. Negative for activity change, appetite change, chills, diaphoresis, fatigue and fever. HENT: Negative. Negative for congestion, ear discharge, ear pain, sinus pain, sinus pressure, sore throat and trouble swallowing. Eyes: Negative. Negative for photophobia, pain, redness, itching and visual disturbance. Respiratory: Negative. Negative for chest tightness, shortness of breath and wheezing. Cardiovascular: Negative.   Negative for chest pain, palpitations and leg swelling. Gastrointestinal: Positive for abdominal pain ( moderate over the past severeal days and described as cramping pain ), anal bleeding ( noticed last night following attempted digital disimpaction while at home. Continued this morning per pt. ) and constipation ( ongoing for the past several days ). Negative for abdominal distention, nausea and vomiting. Endocrine: Negative. Genitourinary: Negative. Negative for difficulty urinating, dysuria, flank pain, frequency, menstrual problem, urgency and vaginal pain. Musculoskeletal: Negative. Negative for back pain, joint swelling, neck pain and neck stiffness. Skin: Negative for color change, pallor and rash. Wound: operative sites bilateral breasts. Allergic/Immunologic: Negative. Neurological: Negative. Negative for dizziness, speech difficulty, weakness and headaches. Hematological: Negative. Does not bruise/bleed easily. Psychiatric/Behavioral: Negative. Negative for behavioral problems. The patient is not nervous/anxious. All other systems reviewed and are negative. Vitals:    09/23/17 0842 09/23/17 1129   BP: 128/72 155/57   Pulse: 94 89   Resp: 17 16   Temp: 98 °F (36.7 °C)    SpO2: 100% 98%   Weight: 80.7 kg (178 lb)    Height: 5' 6\" (1.676 m)             Physical Exam   Constitutional: She is oriented to person, place, and time. She appears well-developed and well-nourished. No distress. HENT:   Head: Normocephalic and atraumatic. Right Ear: External ear normal.   Left Ear: External ear normal.   Nose: Nose normal.   Mouth/Throat: Oropharynx is clear and moist.   Eyes: Conjunctivae and EOM are normal. Pupils are equal, round, and reactive to light. Right eye exhibits no discharge. Left eye exhibits no discharge. Neck: Normal range of motion. Neck supple. No JVD present. No tracheal deviation present. Cardiovascular: Normal rate, regular rhythm, normal heart sounds and intact distal pulses. Exam reveals no gallop. No murmur heard. Pulmonary/Chest: Effort normal and breath sounds normal. No respiratory distress. She has no wheezes. She has no rales. She exhibits no tenderness. Abdominal: Soft. Bowel sounds are normal. She exhibits no distension. There is no tenderness. There is no rebound and no guarding. Genitourinary: Rectal exam shows guaiac positive stool. No vaginal discharge found. Genitourinary Comments: Bright red blood at anus   Musculoskeletal: Normal range of motion. She exhibits no edema, tenderness or deformity. Neurological: She is alert and oriented to person, place, and time. Skin: Skin is warm and dry. No rash noted. No erythema. No pallor. Psychiatric: She has a normal mood and affect. Her behavior is normal. Judgment and thought content normal.   Nursing note and vitals reviewed. MDM  Number of Diagnoses or Management Options  Drug-induced constipation: new and requires workup  Diagnosis management comments: Plan:   Discharge to home with Miralax. Follow up with surgeon and PCP. ED Course   0840: Initial assessment of patient as documented. Patient states she tried to manually disimpact  Herself using her finger. 8077: Aware of patient complaints of constipation. Soap suds enema ordered. 1100: reassessment of patient. States she had a large bowel movement after the enema and feels \"much better. \" BRB per rectum diminished. Educated on need to not use finger for disimpaction. 1111:   Pt has been reexamined. Pt has no new complaints, changes or physical findings. Care plan outlined and precautions discussed. All available results were reviewed with pt. All medications were reviewed with pt. All of pt's questions and concerns were addressed. Pt agrees to F/U as instructed and agrees to return to ED upon further deterioration. Pt is ready to go home. Kizzy Kee NP    Procedures    I was personally available for consultation in the emergency department.   I have reviewed the chart and agree with the documentation recorded by the [facility] AND [facility], including the assessment, treatment plan, and disposition.     Melissa Richard MD

## 2017-09-23 NOTE — ED NOTES
Pt ambulated to restroom for soap suds enema, which was unsuccessful. Catheter was advanced fully and enema did not infuse even with bag pressure. Stool noted all along catheter; minimal stool evacuated on toilet paper. Pt stated, \"Can you help me wipe? I'm too weak. \" Pt advised that she is able to wipe herself, baby wipes provided. Pt ambulated back to room by herself. NP notified.

## 2018-01-01 ENCOUNTER — HOSPITAL ENCOUNTER (EMERGENCY)
Age: 23
Discharge: HOME | End: 2018-01-01
Attending: EMERGENCY MEDICINE
Payer: COMMERCIAL

## 2018-01-01 VITALS
OXYGEN SATURATION: 99 % | HEART RATE: 92 BPM | WEIGHT: 180 LBS | DIASTOLIC BLOOD PRESSURE: 80 MMHG | RESPIRATION RATE: 16 BRPM | SYSTOLIC BLOOD PRESSURE: 135 MMHG | HEIGHT: 66 IN | TEMPERATURE: 97.9 F | BODY MASS INDEX: 28.93 KG/M2

## 2018-01-01 DIAGNOSIS — A87.9 VIRAL MENINGITIS: Primary | ICD-10-CM

## 2018-01-01 PROCEDURE — 74011250636 HC RX REV CODE- 250/636: Performed by: EMERGENCY MEDICINE

## 2018-01-01 PROCEDURE — 96374 THER/PROPH/DIAG INJ IV PUSH: CPT

## 2018-01-01 PROCEDURE — 99282 EMERGENCY DEPT VISIT SF MDM: CPT

## 2018-01-01 PROCEDURE — 74011250636 HC RX REV CODE- 250/636: Performed by: NURSE PRACTITIONER

## 2018-01-01 PROCEDURE — 96361 HYDRATE IV INFUSION ADD-ON: CPT

## 2018-01-01 PROCEDURE — 96375 TX/PRO/DX INJ NEW DRUG ADDON: CPT

## 2018-01-01 RX ORDER — KETOROLAC TROMETHAMINE 30 MG/ML
30 INJECTION, SOLUTION INTRAMUSCULAR; INTRAVENOUS
Status: COMPLETED | OUTPATIENT
Start: 2018-01-01 | End: 2018-01-01

## 2018-01-01 RX ORDER — KETOROLAC TROMETHAMINE 10 MG/1
10 TABLET, FILM COATED ORAL
Qty: 20 TAB | Refills: 0 | Status: SHIPPED | OUTPATIENT
Start: 2018-01-01 | End: 2018-01-06

## 2018-01-01 RX ORDER — ONDANSETRON 2 MG/ML
4 INJECTION INTRAMUSCULAR; INTRAVENOUS
Status: COMPLETED | OUTPATIENT
Start: 2018-01-01 | End: 2018-01-01

## 2018-01-01 RX ORDER — ACETAMINOPHEN 500 MG
1000 TABLET ORAL
Qty: 40 TAB | Refills: 0 | Status: SHIPPED | OUTPATIENT
Start: 2018-01-01 | End: 2018-01-11

## 2018-01-01 RX ADMIN — KETOROLAC TROMETHAMINE 30 MG: 30 INJECTION, SOLUTION INTRAMUSCULAR at 14:28

## 2018-01-01 RX ADMIN — SODIUM CHLORIDE 1000 ML: 9 INJECTION, SOLUTION INTRAVENOUS at 13:52

## 2018-01-01 RX ADMIN — ONDANSETRON 4 MG: 2 INJECTION INTRAMUSCULAR; INTRAVENOUS at 13:53

## 2018-01-01 RX ADMIN — SODIUM CHLORIDE 1000 ML: 900 INJECTION, SOLUTION INTRAVENOUS at 15:09

## 2018-01-01 NOTE — ED TRIAGE NOTES
Pt states she has been having a fever and headache x1 week. Reports she was dx with a sinus infection last week and placed on amoxicillin. Reports she took tylenol a few hours ago.

## 2018-01-01 NOTE — ED PROVIDER NOTES
HPI Comments: The patient is a 23 y.o. female with no significant past medical history who presents from home with chief complaint of high fevers, body aches and headache for 4 days. Her symptoms include: fevers as high as 104, HA that radiates from up her neck to her forehead, nausea, vomiting, constipation, fatigue and chills. She has tried Tylenol and Nyquil. Patient states that she was seen at Urgent care 3 days ago. She was flu and strep negative. Patient denies: diarrhea, loss of bowel or bladder, chest pain, shortness of breath or dyspnea on exertion. Nothing makes the symptoms better or worse. She mentioned to the RN that she took Plan B yesterday but couldn't give a good reason why. There are no other acute medical concerns at this time. PCP: John Cook MD      The history is provided by the patient. No  was used. Past Medical History:   Diagnosis Date    ADHD (attention deficit hyperactivity disorder)     Not medicated    Anxiety     Depression     Eczema     Ran out of her Rx    HPV vaccine counseling     Rec'd all 3 Gardasil    Molluscum contagiosum 3/2015    on biopsy, inner left thigh    Scoliosis     Screening for malignant neoplasm of the cervix 2013    Negative per pt. @ [address] [place] [address]       Past Surgical History:   Procedure Laterality Date    HX BREAST REDUCTION Bilateral 9/13/2017    BILATERAL BREAST REDUCTION POSSIBLE FREE NIPPLE GRAFT performed by Mei Ball MD at [facility] MAIN [facility]         Family History:   Problem Relation Age of Onset    Diabetes Paternal Uncle     Psychotic Disorder Father     Hypertension Maternal Grandmother     Heart Disease Maternal Grandmother     Diabetes Maternal Grandfather        Social History     Social History    Marital status: SINGLE     Spouse name: N/A    Number of children: N/A    Years of education: N/A     Occupational History    Not on file.      Social History Main Topics    Smoking status: Never Smoker    Smokeless tobacco: Never Used    Alcohol use Yes      Comment: socially    Drug use: No    Sexual activity: Not Currently     Partners: Male     Birth control/ protection: Condom     Other Topics Concern    Not on file     Social History Narrative         ALLERGIES: Allegra [fexofenadine]    Review of Systems   Constitutional: Positive for appetite change, chills, fatigue and fever. HENT: Positive for rhinorrhea. Negative for trouble swallowing and voice change. Eyes: Positive for photophobia and visual disturbance. Respiratory: Negative for cough, shortness of breath and wheezing. Cardiovascular: Negative for chest pain. Gastrointestinal: Positive for nausea and vomiting. Negative for abdominal pain, constipation and diarrhea. Genitourinary: Negative for dysuria and urgency. Musculoskeletal: Negative for back pain. Skin: Negative for color change and rash. Neurological: Positive for headaches. Negative for dizziness. Psychiatric/Behavioral: Negative. All other systems reviewed and are negative. Vitals:    01/01/18 1236 01/01/18 1400   BP: 135/80    Pulse: 92    Resp: 16    Temp: 97.9 °F (36.6 °C)    SpO2: 100% 99%   Weight: 81.6 kg (180 lb)    Height: 5' 6\" (1.676 m)             Physical Exam   Constitutional: She is oriented to person, place, and time. She appears well-developed and well-nourished. HENT:   Head: Normocephalic and atraumatic. Neck: Trachea normal, normal range of motion, full passive range of motion without pain and phonation normal. Neck supple. No tracheal tenderness present. No tracheal deviation present. No Brudzinski's sign and no Kernig's sign noted. Cardiovascular: Normal rate, regular rhythm, normal heart sounds and intact distal pulses. Exam reveals no gallop, no distant heart sounds and no friction rub. No murmur heard. Pulmonary/Chest: Effort normal and breath sounds normal. No stridor. No respiratory distress.  She has no decreased breath sounds. She has no wheezes. She has no rhonchi. She has no rales. She exhibits no tenderness. Abdominal: Soft. Normal appearance and bowel sounds are normal. There is no tenderness. There is no guarding and no CVA tenderness. Musculoskeletal: Normal range of motion. Lymphadenopathy:        Head (right side): No submental, no submandibular, no tonsillar, no preauricular and no posterior auricular adenopathy present. Head (left side): No submental, no submandibular, no tonsillar, no preauricular and no posterior auricular adenopathy present. She has no cervical adenopathy. Neurological: She is alert and oriented to person, place, and time. She has normal strength. No cranial nerve deficit or sensory deficit. She displays a negative Romberg sign. GCS eye subscore is 4. GCS verbal subscore is 5. GCS motor subscore is 6. Skin: Skin is warm and dry. No erythema. Psychiatric: She has a normal mood and affect. Her behavior is normal. Judgment and thought content normal.   Nursing note and vitals reviewed. [facility]  ED Course     Assessment & Plan:    Likely Viral meningitis    No labs at this time. Supportive care    NS bolus 1L  Zofran for nausea  toradol 30mg IV    No red flag symptoms that necessitates imaging, LP or labs    Seen by and Discussed with MD Blessing Babcock NP  01/01/18  2:18 PM    One additional liter BRAYDEN Anderson NP  01/01/18  3:08 PM      3:34 PM  The patient has been reevaluated. The patient is ready for discharge. The patient's signs, symptoms, diagnosis, and discharge instructions have been discussed and the patient/ family has conveyed their understanding. The patient is to follow up as recommended or return to the ED should their symptoms worsen. Plan has been discussed and the patient is in agreement. LABORATORY TESTS:  No results found for this or any previous visit (from the past 12 hour(s)).     IMAGING RESULTS:  No orders to display     No results found. MEDICATIONS GIVEN:  Medications   sodium chloride 0.9 % bolus infusion 1,000 mL (1,000 mL IntraVENous New Bag 1/1/18 1509)   sodium chloride 0.9 % bolus infusion 1,000 mL (0 mL IntraVENous IV Completed 1/1/18 1508)   ondansetron (ZOFRAN) injection 4 mg (4 mg IntraVENous Given 1/1/18 1353)   ketorolac (TORADOL) injection 30 mg (30 mg IntraVENous Given 1/1/18 1428)       IMPRESSION:  1. Viral meningitis        PLAN:  1. Current Discharge Medication List      START taking these medications    Details   ketorolac (TORADOL) 10 mg tablet Take 1 Tab by mouth every six (6) hours as needed for Pain for up to 5 days. Indications: Severe Pain  Qty: 20 Tab, Refills: 0      acetaminophen (TYLENOL) 500 mg tablet Take 2 Tabs by mouth every six (6) hours as needed for Pain or Fever for up to 10 days. Indications: Headache Disorder, Pain  Qty: 40 Tab, Refills: 0           2. Follow-up Information     Follow up With Details Comments MD Henrik Schedule an appointment as soon as possible for a visit As needed, If symptoms worsen or do not improve [address] [place] [address]  [phone]      [facility] EMERGENCY DEPT  As needed, If symptoms worsen [facility]  [phone]        3.  Discussed return symptoms    Return to ED for new or worsening symptoms       Sukhwinder Zavala NP            Procedures

## 2018-01-01 NOTE — LETTER
NOTIFICATION RETURN TO WORK / SCHOOL 
 
1/1/2018 3:28 PM 
 
Ms. Gely Contreras [address] [place] [address] [address] [phone] To Whom It May Concern: 
 
Gely Contreras is currently under the care of [facility] EMERGENCY DEPT. She will return to work/school on: when feeling better after illness If there are questions or concerns please have the patient contact our office. Sincerely, Shauna Luo NP 
01/01/18 
3:29 PM

## 2018-01-01 NOTE — DISCHARGE INSTRUCTIONS
Viral Meningitis: Care Instructions  Your Care Instructions    Viral meningitis is an illness that causes inflammation in the tissues that surround the brain and spinal cord. It is not the same as bacterial meningitis. The viral illness is often milder. Bacterial meningitis can be very serious. You may have had a lumbar puncture test (spinal tap). This is done to find the cause of your symptoms. These often include a bad headache, a fever, and a stiff neck. Most people get better without treatment in a few weeks. But some people may feel weak and tired for months after the illness. The doctor has checked you carefully, but problems can develop later. If you notice any problems or new symptoms, get medical treatment right away. Follow-up care is a key part of your treatment and safety. Be sure to make and go to all appointments, and call your doctor if you are having problems. It's also a good idea to know your test results and keep a list of the medicines you take. How can you care for yourself at home? · Get plenty of rest. Stay in bed if you can. · Be safe with medicines. Read and follow all instructions on the label. ¨ If the doctor gave you a prescription medicine for pain, take it as prescribed. ¨ If you are not taking a prescription pain medicine, ask your doctor if you can take an over-the-counter medicine. · To prevent dehydration, drink plenty of fluids, enough so that your urine is light yellow or clear like water. Choose water and other caffeine-free clear liquids until you feel better. If you have kidney, heart, or liver disease and have to limit fluids, talk with your doctor before you increase the amount of fluids you drink. · If you have had a lumbar puncture test (spinal tap): ¨ You may be told to lie flat in bed or with your head slightly raised for 1 to 2 hours. This is to keep your headache from getting worse.   ¨ You may feel tired and have a slight backache the day after the test. Some people have trouble sleeping for 1 to 2 days. · Wash your hands often to prevent spreading the infection to others. When should you call for help? Call 911 anytime you think you may need emergency care. For example, call if:  ? · You have a seizure. ?Call your doctor nowor seek immediate medical care if:  ? · You have a fever. ? · You have a severe headache. ? · You have a stiff neck. ? · You are nauseated or are vomiting. ? · You are confused or cannot think clearly. ? Watch closely for changes in your health, and be sure to contact your doctor if:  ? · You notice new numbness or weakness. ? · You do not get better as expected. Where can you learn more? Go to http://pino-dorinda.info/. Enter P636 in the search box to learn more about \"Viral Meningitis: Care Instructions. \"  Current as of: March 3, 2017  Content Version: 11.4  © 8500-3548 [employer]. Care instructions adapted under license by [employer] (which disclaims liability or warranty for this information). If you have questions about a medical condition or this instruction, always ask your healthcare professional. [address] any warranty or liability for your use of this information.

## 2018-07-09 NOTE — IP AVS SNAPSHOT
[address] 
[phone] Patient: Harpal Gavin MRN: [id number] EDUARDO:[birth year] Current Discharge Medication List  
  
START taking these medications Dose & Instructions Dispensing Information Comments Morning Noon Evening Bedtime  
 oxyCODONE-acetaminophen 5-325 mg per tablet Commonly known as:  PERCOCET Your last dose was: Your next dose is:    
   
   
 Dose:  1 Tab Take 1 Tab by mouth every four (4) hours as needed. Max Daily Amount: 6 Tabs. Quantity:  45 Tab Refills:  0 STOP taking these medications   
 aspirin 81 mg chewable tablet  
   
  
 desoximetasone 0.25 % topical cream  
Commonly known as:  TOPICORT  
   
  
 ibuprofen 200 mg tablet Commonly known as:  MOTRIN  
   
  
 melatonin Tab tablet Where to Get Your Medications Information on where to get these meds will be given to you by the nurse or doctor. ! Ask your nurse or doctor about these medications  
  oxyCODONE-acetaminophen 5-325 mg per tablet Medication Prior Auth team triggered the prior auth by hitting the \"request PA\" button.    In the future, PLEASE verify RX benefits, then under MEDS & ORDERS click the \"REQUEST PA\" for the medication.

## 2018-11-14 PROCEDURE — 90791 PSYCH DIAGNOSTIC EVALUATION: CPT

## 2018-11-14 PROCEDURE — 99284 EMERGENCY DEPT VISIT MOD MDM: CPT

## 2018-11-15 ENCOUNTER — HOSPITAL ENCOUNTER (EMERGENCY)
Age: 23
Discharge: HOME | End: 2018-11-15
Attending: EMERGENCY MEDICINE
Payer: COMMERCIAL

## 2018-11-15 VITALS
BODY MASS INDEX: 28.32 KG/M2 | TEMPERATURE: 98.3 F | WEIGHT: 170 LBS | OXYGEN SATURATION: 100 % | RESPIRATION RATE: 16 BRPM | HEART RATE: 72 BPM | DIASTOLIC BLOOD PRESSURE: 83 MMHG | HEIGHT: 65 IN | SYSTOLIC BLOOD PRESSURE: 110 MMHG

## 2018-11-15 DIAGNOSIS — F41.8 ANXIETY ASSOCIATED WITH DEPRESSION: Primary | ICD-10-CM

## 2018-11-15 RX ORDER — HYDROXYZINE 50 MG/1
50 TABLET, FILM COATED ORAL
Qty: 30 TAB | Refills: 0 | Status: SHIPPED | OUTPATIENT
Start: 2018-11-15 | End: 2018-11-15

## 2018-11-15 RX ORDER — HYDROXYZINE 50 MG/1
50 TABLET, FILM COATED ORAL
Qty: 30 TAB | Refills: 0 | Status: SHIPPED | OUTPATIENT
Start: 2018-11-15 | End: 2018-11-25

## 2018-11-15 NOTE — BSMART NOTE
Patient is 20year old  Female pt arrives via private car with c/o anxiety and depression . Pt is A&O x4  , pt denies SI and HI . No other complaints or pain. At bedside, patient denied suicidal thoughts and thoughts of self-harm, denied homicidal thoughts and hallucinations. Patient reported she hasnot been sleeping good over the past week. As reported she will lay down but toss and turn. Patient reported when she is very tired and cannot sleep she sometimes she feels it would be easier not to be around but she would not harm herself. Patient was hospitalized in 2015. Patient does not currently have a psychiatrist of therapist or reported because they always try and given her antidepressants. Patient was willing to take resources. Patient reported that she has been stressed because she has been trying for about 5 months to get pregnant and still hasnot been successful. Patient reported going through some stuff with her boyfriend, new job and her own insecurities. Patient wasnot seeking admission and does not meet criteria for an admission. Dr. Raquel Moss was consulted and inagreement with discharge.

## 2018-11-15 NOTE — ED NOTES
Discharge instructions given to pt by MD and RN . Pt has been given counseling on med use and verbalizes  Understanding. Pt ambulated off unit with no signs of distress.

## 2018-11-15 NOTE — ED PROVIDER NOTES
Patient is 20year old female with a past medical history significant for anxiety and depression who arrives via private car with c/o anxiety and depression . Pt is A&O x4  , pt denies SI and HI . No other complaints or pain. At bedside, patient denied suicidal thoughts and thoughts of self-harm, denied homicidal thoughts and hallucinations. Patient reported she hasnot been sleeping good over the past week. As reported she will lay down but toss and turn. Patient reported when she is very tired and cannot sleep she sometimes she feels it would be easier not to be around but she would not harm herself. Patient was hospitalized in 2015. Patient does not currently have a psychiatrist of therapist or reported because they always try and given her antidepressants. The patient also denies any other complaints including chest pain, shortness of breath, nausea or vomiting, abdominal pain, dizziness, extremity weakness, numbness. Past Medical History:  
Diagnosis Date  ADHD (attention deficit hyperactivity disorder) Not medicated  Anxiety  Depression  Eczema Ran out of her Rx  HPV vaccine counseling Rec'd all 3 Gardasil  Molluscum contagiosum 3/2015  
 on biopsy, inner left thigh  Scoliosis  Screening for malignant neoplasm of the cervix 2013 Negative per pt. @ [facility] History reviewed. No pertinent surgical history. Family History:  
Problem Relation Age of Onset  Diabetes Paternal Uncle  Psychotic Disorder Father  Hypertension Maternal Grandmother  Heart Disease Maternal Grandmother  Diabetes Maternal Grandfather Social History Socioeconomic History  Marital status: SINGLE Spouse name: Not on file  Number of children: Not on file  Years of education: Not on file  Highest education level: Not on file Social Needs  Financial resource strain: Not on file  Food insecurity - worry: Not on file  Food insecurity - inability: Not on file  Transportation needs - medical: Not on file  Transportation needs - non-medical: Not on file Occupational History  Not on file Tobacco Use  Smoking status: Never Smoker  Smokeless tobacco: Never Used Substance and Sexual Activity  Alcohol use: Yes Comment: socially  Drug use: No  
 Sexual activity: Not Currently Partners: Male Birth control/protection: Condom Other Topics Concern  Not on file Social History Narrative  Not on file ALLERGIES: Shauna Lemus Review of Systems All other systems reviewed and are negative. Vitals:  
 11/14/18 2357 Pulse: 89 SpO2: 99% Physical Exam  
Nursing note and vitals reviewed. CONSTITUTIONAL: Well-appearing; well-nourished; in no apparent distress HEAD: Normocephalic; atraumatic EYES: PERRL; EOM intact; conjunctiva and sclera are clear bilaterally. ENT: No rhinorrhea; normal pharynx with no tonsillar hypertrophy; mucous membranes pink/moist, no erythema, no exudate. NECK: Supple; non-tender; no cervical lymphadenopathy CARD: Normal S1, S2; no murmurs, rubs, or gallops. Regular rate and rhythm. RESP: Normal respiratory effort; breath sounds clear and equal bilaterally; no wheezes, rhonchi, or rales. ABD: Normal bowel sounds; non-distended; non-tender; no palpable organomegaly, no masses, no bruits. Back Exam: Normal inspection; no vertebral point tenderness, no CVA tenderness. Normal range of motion. EXT: Normal ROM in all four extremities; non-tender to palpation; no swelling or deformity; distal pulses are normal, no edema. SKIN: Warm; dry; no rash. NEURO:Alert and oriented x 3, coherent, RADHA-XII grossly intact, sensory and motor are non-focal. 
Psych exam: Flat affect and depressed mood. Pt denies SI/HI 
 
 
 
MDM Number of Diagnoses or Management Options Anxiety associated with depression: Diagnosis management comments: Assessment: panic anxiety syndrome/ anxiety, and depression/ alcohol abuse/ substance-induced mood disorder/ insomnia Plan: Lab/ [facility] consult for psychiatric evaluation when the patient is medically clear/ Monitor and Reevaluate. Amount and/or Complexity of Data Reviewed Clinical lab tests: ordered and reviewed Tests in the radiology section of CPT®: ordered and reviewed Tests in the medicine section of CPT®: reviewed and ordered Discussion of test results with the performing providers: yes Obtain history from someone other than the patient: yes Review and summarize past medical records: yes Discuss the patient with other providers: yes Independent visualization of images, tracings, or specimens: yes Procedures Progress Note:  
Pt has been reexamined by Sarthak Santiago MD. Pt is feeling much better. Symptoms have improved. The patient was seen and evaluated by [facility], who after discussion with the on call psychiatrist, recommends outpatient treatment. All available results have been reviewed with pt and any available family. Pt understands sx, dx, and tx in ED. Care plan has been outlined and questions have been answered. Pt is ready to go home. Will send home on Anxiety and depression instruction. Prescription of Hydroxyzine. Outpatient referral with PCP as needed.  Written by Sarthak Santiago MD,1:08 AM

## 2018-11-15 NOTE — ED TRIAGE NOTES
Triage note : pt arrives via private car with c/o anxiety and depression . Pt is A&O x4  , pt denies SI and HI . No other complaints or pain

## 2018-11-15 NOTE — DISCHARGE INSTRUCTIONS
Learning About Generalized Anxiety Disorder  What is generalized anxiety disorder? We all worry. It's a normal part of life. But when you have generalized anxiety disorder, you worry about lots of things and have a hard time stopping your worry. This worry or anxiety interferes with your relationships, work, and life. What causes it? The cause is not known. But it may be passed down through families. What are the symptoms? You may feel anxious or worry most days about things like work, relationships, health, or money. You may worry about things that are unlikely to happen. You find it hard to stop or control the worry. Because you worry a lot and try hard to stop worrying, you may feel restless, tired, tense, or cranky. You may also find it hard to think or sleep. And you may have headaches or an upset stomach. How is it diagnosed? Your doctor will ask about your health and how often you worry or feel anxious. He or she may ask about other symptoms, like whether you:  · Feel restless. · Feel tired. · Have a hard time thinking or feel that your mind goes blank. · Feel cranky. · Have tense muscles. · Have sleep problems. A physical exam and tests can help make sure that your symptoms aren't caused by a different condition, such as a thyroid problem. How is it treated? Counseling and medicine can both work to treat anxiety. The two are often used along with lifestyle changes. Cognitive-behavioral therapy (CBT) is a type of counseling that's used to help treat anxiety. In CBT, you learn how to notice and replace thoughts that make you feel worried. It also can help you learn how to relax when you worry. Medicines can help. These medicines are often also used for depression. Selective serotonin reuptake inhibitors (SSRIs) are often tried first. But there are other medicines that your doctor may use. You may need to try a few medicines to find one that works well.   Many people feel better by getting regular exercise, eating healthy meals, and getting good sleep. Mindfulness--focusing on things that happen in the present moment--also can help reduce your anxiety. What can you expect when you have it? Having anxiety can be upsetting. Some people might feel less worried and stressed after a couple of months of treatment. But for other people, it might take longer to feel better. Reaching out to people for help is important. Try not to isolate yourself. Let your family and friends help you. Find someone you can trust and confide in. Talk to that person. When you know what anxiety is--and how you can get help for it--you can start to learn new ways of thinking. This can help you cope and work through your anxiety. Follow-up care is a key part of your treatment and safety. Be sure to make and go to all appointments, and call your doctor if you are having problems. It's also a good idea to know your test results and keep a list of the medicines you take. Where can you learn more? Go to http://pino-dorinda.info/. Enter G110 in the search box to learn more about \"Learning About Generalized Anxiety Disorder. \"  Current as of: April 14, 2018  Content Version: 11.8  © 1891-0084 [employer]. Care instructions adapted under license by [employer] (which disclaims liability or warranty for this information). If you have questions about a medical condition or this instruction, always ask your healthcare professional. [address] any warranty or liability for your use of this information.

## 2018-12-14 ENCOUNTER — HOSPITAL ENCOUNTER (EMERGENCY)
Age: 23
Discharge: HOME | End: 2018-12-15
Attending: EMERGENCY MEDICINE
Payer: COMMERCIAL

## 2018-12-14 DIAGNOSIS — R50.9 FEVER, UNSPECIFIED FEVER CAUSE: Primary | ICD-10-CM

## 2018-12-14 DIAGNOSIS — N12 PYELONEPHRITIS: ICD-10-CM

## 2018-12-14 LAB
ALBUMIN SERPL-MCNC: 3.9 G/DL (ref 3.5–5)
ALBUMIN/GLOB SERPL: 1 {RATIO} (ref 1.1–2.2)
ALP SERPL-CCNC: 84 U/L (ref 45–117)
ALT SERPL-CCNC: 16 U/L (ref 12–78)
ANION GAP SERPL CALC-SCNC: 8 MMOL/L (ref 5–15)
APPEARANCE UR: ABNORMAL
AST SERPL-CCNC: 18 U/L (ref 15–37)
BACTERIA URNS QL MICRO: ABNORMAL /HPF
BASOPHILS # BLD: 0.1 K/UL (ref 0–0.1)
BASOPHILS NFR BLD: 0 % (ref 0–1)
BILIRUB SERPL-MCNC: 0.4 MG/DL (ref 0.2–1)
BILIRUB UR QL: NEGATIVE
BUN SERPL-MCNC: 9 MG/DL (ref 6–20)
BUN/CREAT SERPL: 9 (ref 12–20)
CALCIUM SERPL-MCNC: 8.8 MG/DL (ref 8.5–10.1)
CHLORIDE SERPL-SCNC: 105 MMOL/L (ref 97–108)
CO2 SERPL-SCNC: 26 MMOL/L (ref 21–32)
COLOR UR: ABNORMAL
COMMENT, HOLDF: NORMAL
CREAT SERPL-MCNC: 0.97 MG/DL (ref 0.55–1.02)
DIFFERENTIAL METHOD BLD: ABNORMAL
EOSINOPHIL # BLD: 0.1 K/UL (ref 0–0.4)
EOSINOPHIL NFR BLD: 1 % (ref 0–7)
EPITH CASTS URNS QL MICRO: ABNORMAL /LPF
ERYTHROCYTE [DISTWIDTH] IN BLOOD BY AUTOMATED COUNT: 13.2 % (ref 11.5–14.5)
FLUAV AG NPH QL IA: NEGATIVE
FLUBV AG NOSE QL IA: NEGATIVE
GLOBULIN SER CALC-MCNC: 4.1 G/DL (ref 2–4)
GLUCOSE SERPL-MCNC: 90 MG/DL (ref 65–100)
GLUCOSE UR STRIP.AUTO-MCNC: NEGATIVE MG/DL
HCG UR QL: NEGATIVE
HCT VFR BLD AUTO: 39.9 % (ref 35–47)
HGB BLD-MCNC: 12.7 G/DL (ref 11.5–16)
HGB UR QL STRIP: ABNORMAL
IMM GRANULOCYTES # BLD: 0.1 K/UL (ref 0–0.04)
IMM GRANULOCYTES NFR BLD AUTO: 1 % (ref 0–0.5)
KETONES UR QL STRIP.AUTO: ABNORMAL MG/DL
LEUKOCYTE ESTERASE UR QL STRIP.AUTO: ABNORMAL
LYMPHOCYTES # BLD: 2.2 K/UL (ref 0.8–3.5)
LYMPHOCYTES NFR BLD: 14 % (ref 12–49)
MCH RBC QN AUTO: 32.2 PG (ref 26–34)
MCHC RBC AUTO-ENTMCNC: 31.8 G/DL (ref 30–36.5)
MCV RBC AUTO: 101.3 FL (ref 80–99)
MONOCYTES # BLD: 1.6 K/UL (ref 0–1)
MONOCYTES NFR BLD: 10 % (ref 5–13)
NEUTS SEG # BLD: 11.8 K/UL (ref 1.8–8)
NEUTS SEG NFR BLD: 74 % (ref 32–75)
NITRITE UR QL STRIP.AUTO: POSITIVE
NRBC # BLD: 0 K/UL (ref 0–0.01)
NRBC BLD-RTO: 0 PER 100 WBC
PH UR STRIP: 6 [PH] (ref 5–8)
PLATELET # BLD AUTO: 380 K/UL (ref 150–400)
PMV BLD AUTO: 9.7 FL (ref 8.9–12.9)
POTASSIUM SERPL-SCNC: 3.6 MMOL/L (ref 3.5–5.1)
PROT SERPL-MCNC: 8 G/DL (ref 6.4–8.2)
PROT UR STRIP-MCNC: 30 MG/DL
RBC # BLD AUTO: 3.94 M/UL (ref 3.8–5.2)
RBC #/AREA URNS HPF: ABNORMAL /HPF (ref 0–5)
SAMPLES BEING HELD,HOLD: NORMAL
SODIUM SERPL-SCNC: 139 MMOL/L (ref 136–145)
SP GR UR REFRACTOMETRY: 1.02 (ref 1–1.03)
UR CULT HOLD, URHOLD: NORMAL
UROBILINOGEN UR QL STRIP.AUTO: 0.2 EU/DL (ref 0.2–1)
WBC # BLD AUTO: 15.8 K/UL (ref 3.6–11)
WBC URNS QL MICRO: >100 /HPF (ref 0–4)

## 2018-12-14 PROCEDURE — 74011250637 HC RX REV CODE- 250/637: Performed by: EMERGENCY MEDICINE

## 2018-12-14 PROCEDURE — 81001 URINALYSIS AUTO W/SCOPE: CPT

## 2018-12-14 PROCEDURE — 96361 HYDRATE IV INFUSION ADD-ON: CPT

## 2018-12-14 PROCEDURE — 74011250636 HC RX REV CODE- 250/636: Performed by: EMERGENCY MEDICINE

## 2018-12-14 PROCEDURE — 93005 ELECTROCARDIOGRAM TRACING: CPT

## 2018-12-14 PROCEDURE — 80053 COMPREHEN METABOLIC PANEL: CPT

## 2018-12-14 PROCEDURE — 99285 EMERGENCY DEPT VISIT HI MDM: CPT

## 2018-12-14 PROCEDURE — 87086 URINE CULTURE/COLONY COUNT: CPT

## 2018-12-14 PROCEDURE — 96365 THER/PROPH/DIAG IV INF INIT: CPT

## 2018-12-14 PROCEDURE — 87077 CULTURE AEROBIC IDENTIFY: CPT

## 2018-12-14 PROCEDURE — 36415 COLL VENOUS BLD VENIPUNCTURE: CPT

## 2018-12-14 PROCEDURE — 74011000258 HC RX REV CODE- 258: Performed by: EMERGENCY MEDICINE

## 2018-12-14 PROCEDURE — 87804 INFLUENZA ASSAY W/OPTIC: CPT

## 2018-12-14 PROCEDURE — 81025 URINE PREGNANCY TEST: CPT

## 2018-12-14 PROCEDURE — 85025 COMPLETE CBC W/AUTO DIFF WBC: CPT

## 2018-12-14 PROCEDURE — 87186 SC STD MICRODIL/AGAR DIL: CPT

## 2018-12-14 RX ORDER — CEPHALEXIN 500 MG/1
500 CAPSULE ORAL 4 TIMES DAILY
Qty: 40 CAP | Refills: 0 | Status: SHIPPED | OUTPATIENT
Start: 2018-12-14 | End: 2018-12-24

## 2018-12-14 RX ORDER — ACETAMINOPHEN 500 MG
1000 TABLET ORAL
Status: COMPLETED | OUTPATIENT
Start: 2018-12-14 | End: 2018-12-14

## 2018-12-14 RX ORDER — IBUPROFEN 600 MG/1
600 TABLET ORAL
Qty: 20 TAB | Refills: 0 | Status: SHIPPED | OUTPATIENT
Start: 2018-12-14 | End: 2019-07-24

## 2018-12-14 RX ORDER — IBUPROFEN 200 MG
200 TABLET ORAL AS NEEDED
COMMUNITY
End: 2019-07-24

## 2018-12-14 RX ORDER — CHOLECALCIFEROL (VITAMIN D3) 125 MCG
1 CAPSULE ORAL AS NEEDED
COMMUNITY
End: 2019-07-24

## 2018-12-14 RX ORDER — PROCHLORPERAZINE MALEATE 10 MG
10 TABLET ORAL
Qty: 12 TAB | Refills: 0 | Status: SHIPPED | OUTPATIENT
Start: 2018-12-14 | End: 2018-12-21

## 2018-12-14 RX ADMIN — SODIUM CHLORIDE 1000 ML: 900 INJECTION, SOLUTION INTRAVENOUS at 21:27

## 2018-12-14 RX ADMIN — ACETAMINOPHEN 1000 MG: 500 TABLET ORAL at 21:27

## 2018-12-14 RX ADMIN — CEFTRIAXONE 1 G: 1 INJECTION, POWDER, FOR SOLUTION INTRAMUSCULAR; INTRAVENOUS at 23:48

## 2018-12-14 NOTE — LETTER
[address] 
[address] [place] [phone] 
[phone] Work/School Note Date: 12/14/2018 To Whom It May concern: 
 
Jimena Mccloud was seen and treated today in the emergency room by the following provider(s): 
Attending Provider: Maribel Lopes MD.   
 
Jimena Mccloud {Return to school/sport/work:14153} Sincerely, Janace Leyden, RN

## 2018-12-14 NOTE — LETTER
[address] 
[address] [facility] [phone] 
[phone] Work/School Note Date: 12/14/2018 To Whom It May concern: 
 
Alejandro Drake was seen and treated today in the emergency room by the following provider(s): 
Attending Provider: Maria Victoria Welsh MD.   
 
Alejandro Drake may return to work on 12/17/2018. Sincerely, Vika Daniel RN

## 2018-12-15 VITALS
TEMPERATURE: 98.6 F | WEIGHT: 170 LBS | OXYGEN SATURATION: 100 % | SYSTOLIC BLOOD PRESSURE: 102 MMHG | RESPIRATION RATE: 16 BRPM | HEART RATE: 95 BPM | DIASTOLIC BLOOD PRESSURE: 54 MMHG | HEIGHT: 65 IN | BODY MASS INDEX: 28.32 KG/M2

## 2018-12-15 LAB
ATRIAL RATE: 115 BPM
CALCULATED P AXIS, ECG09: 68 DEGREES
CALCULATED R AXIS, ECG10: 64 DEGREES
CALCULATED T AXIS, ECG11: 30 DEGREES
DIAGNOSIS, 93000: NORMAL
P-R INTERVAL, ECG05: 128 MS
Q-T INTERVAL, ECG07: 292 MS
QRS DURATION, ECG06: 66 MS
QTC CALCULATION (BEZET), ECG08: 403 MS
VENTRICULAR RATE, ECG03: 115 BPM

## 2018-12-15 NOTE — PROGRESS NOTES
Admission Medication Reconciliation:    Information obtained from:  Patient    Comments/Recommendations: Updated PTA meds/reviewed patient's allergies. 1)  Patient states that she does not take any maintenance medications. Patient took naproxen x1 and ibuprofen x2 today PTA for pain. 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 @ [facility]       Chief Complaint for this Admission:    Chief Complaint   Patient presents with    Fever    Fatigue       Prior to Admission Medications:   Prior to Admission Medications   Prescriptions Last Dose Informant Patient Reported? Taking?   ibuprofen (MOTRIN) 200 mg tablet 12/14/2018 at Unknown time  Yes Yes   Sig: Take 200 mg by mouth as needed for Pain. naproxen sodium 220 mg cap 12/14/2018 at Unknown time  Yes Yes   Sig: Take 1 Cap by mouth as needed for Pain.       Facility-Administered Medications: None

## 2018-12-15 NOTE — ED NOTES
2200 - Pt resting quietly on stretcher talking on the phone. Will ask for urine sample soon. 2230 - Pt ambulatory to restroom with steady gait.

## 2018-12-15 NOTE — ED TRIAGE NOTES
Pt arrives to department c/o fever, chills, headache that began this AM. Pt denies cough, sore throat, vomiting. Pt also c/o nausea and burning with urination. Pt states she took 3 ibuprofen at 2030 PTA.

## 2018-12-15 NOTE — ED NOTES
Discharge instructions discussed with pt. Pt verbalized understanding. Pt ambulatory from department, gait steady.

## 2018-12-15 NOTE — DISCHARGE INSTRUCTIONS
Learning About Fever  What is a fever? A fever is a high body temperature. It's one way your body fights being sick. A fever shows that the body is responding to infection or other illnesses, both minor and severe. A fever is a symptom, not an illness by itself. A fever can be a sign that you are ill, but most fevers are not caused by a serious problem. You may have a fever with a minor illness, such as a cold. But sometimes a very serious infection may cause little or no fever. It is important to look at other symptoms, other conditions you have, and how you feel in general. In children, notice how they act and see what symptoms they complain of. What is a normal body temperature? A normal body temperature is about 98. 6ºF. Some people have a normal temperature that is a little higher or a little lower than this. Your temperature may be a little lower in the morning than it is later in the day. It may go up during hot weather or when you exercise, wear heavy clothes, or take a hot bath. Your temperature may also be different depending on how you take it. A temperature taken in the mouth (oral) or under the arm may be a little lower than your core temperature (rectal). What is a fever temperature? A core temperature of 100.4°F or above is considered a fever. What can cause a fever? A fever may be caused by:  · Infections. This is the most common cause of a fever. Examples of infections that can cause a fever include the flu, a kidney infection, or pneumonia. · Some medicines. · Severe trauma or injury, such as a heart attack, stroke, heatstroke, or burns. · Other medical conditions, such as arthritis and some cancers. How can you treat a fever at home? · Ask your doctor if you can take an over-the-counter pain medicine, such as acetaminophen (Tylenol), ibuprofen (Advil, Motrin), or naproxen (Aleve). Be safe with medicines. Read and follow all instructions on the label.   · To prevent dehydration, drink plenty of fluids. Choose water and other caffeine-free clear liquids until you feel better. If you have kidney, heart, or liver disease and have to limit fluids, talk with your doctor before you increase the amount of fluids you drink. Follow-up care is a key part of your treatment and safety. Be sure to make and go to all appointments, and call your doctor if you are having problems. It's also a good idea to know your test results and keep a list of the medicines you take. Where can you learn more? Go to http://pino-dorinda.info/. Enter G261 in the search box to learn more about \"Learning About Fever. \"  Current as of: November 20, 2017  Content Version: 11.8  © 3582-5110 [employer]. Care instructions adapted under license by [employer] (which disclaims liability or warranty for this information). If you have questions about a medical condition or this instruction, always ask your healthcare professional. [place] [address] any warranty or liability for your use of this information. Kidney Infection: Care Instructions  Your Care Instructions    A kidney infection (pyelonephritis) is a type of urinary tract infection, or UTI. Most UTIs are bladder infections. Kidney infections tend to make people much sicker than bladder infections do. A kidney infection is also more serious because it can cause lasting damage if it is not treated quickly. Follow-up care is a key part of your treatment and safety. Be sure to make and go to all appointments, and call your doctor if you are having problems. It's also a good idea to know your test results and keep a list of the medicines you take. How can you care for yourself at home? · Take your antibiotics as directed. Do not stop taking them just because you feel better. You need to take the full course of antibiotics.   · Drink plenty of water, enough so that your urine is light yellow or clear like water. This may help wash out bacteria that are causing the infection. If you have kidney, heart, or liver disease and have to limit fluids, talk with your doctor before you increase the amount of fluids you drink. · Urinate often. Try to empty your bladder each time. · To relieve pain, take a hot shower or lay a heating pad (set on low) over your lower belly. Never go to sleep with a heating pad in place. Put a thin cloth between the heating pad and your skin. To help prevent kidney infections  · Drink plenty of water each day. This helps you urinate often, which clears bacteria from your system. If you have kidney, heart, or liver disease and have to limit fluids, talk with your doctor before you increase the amount of fluids you drink. · Urinate when you have the urge. Do not hold your urine for a long time. Urinate before you go to sleep. · If you have symptoms of a bladder infection, such as burning when you urinate or having to urinate often, call your doctor so you can treat the problem before it gets worse. If you do not treat a bladder infection quickly, it can spread to the kidney. · Men should keep the tip of the penis clean. If you are a woman, keep these ideas in mind:  · Urinate right after you have sex. · Change sanitary pads often. Avoid douches, feminine hygiene sprays, and other feminine hygiene products that have deodorants. · After going to the bathroom, wipe from front to back. When should you call for help? Call your doctor now or seek immediate medical care if:    · You have symptoms that a kidney infection is getting worse. These may include:  ? Pain or burning when you urinate. ? A frequent need to urinate without being able to pass much urine. ? Pain in the flank, which is just below the rib cage and above the waist on either side of the back. ? Blood in the urine.   ? A fever.     · You are vomiting or nauseated.    Watch closely for changes in your health, and be sure to contact your doctor if:    · You do not get better as expected. Where can you learn more? Go to http://pino-dorinda.info/. Enter L889 in the search box to learn more about \"Kidney Infection: Care Instructions. \"  Current as of: March 15, 2018  Content Version: 11.8  © 1696-0203 [employer]. Care instructions adapted under license by [employer] (which disclaims liability or warranty for this information). If you have questions about a medical condition or this instruction, always ask your healthcare professional. [address] any warranty or liability for your use of this information.

## 2018-12-15 NOTE — ED PROVIDER NOTES
20 y.o. female with past medical history significant for depression, anxiety, ADHD, eczema, scoliosis, molluscum contagiosum and HPV vaccine counseling who presents from home via personal vehicle with chief complaint of headache. Pt states all symptoms started abruptly this morning. Pt states she has a fever, chills, headache, and weakness. Pt states she has been falling and stumbling around because of the weakness. Pt states before coming here she took 3 ibuprofen in order to feel well enough to drive. Pt states when she drinks fluids she has jaw pain. Pt states she believes she has a bladder infection as well because she has burning when she pees accompanied with back pain. Pt states she has anxiety. Pt denies any other medical problems. Pt states she is allergic to sudafed and does not take Zofran because it made her sick after her breast reduction surgery. Pt states her last menstrual cycle was 11/5/2018. Pt states she took a blood pregnancy test a week ago and it was negative. Pt denies having a flu shot this year. Pt denies cough, sore throat and diarrhea. There are no other acute medical concerns at this time. Social hx: No tobacco use, Social EtOH use    PCP: Sean Burkett MD    Note written by Leslie Simental, as dictated by Claudean Fitch, MD 9:10 PM            The history is provided by the patient. No  was used.         Past Medical History:   Diagnosis Date    ADHD (attention deficit hyperactivity disorder)     Not medicated    Anxiety     Depression     Eczema     Ran out of her Rx    HPV vaccine counseling     Rec'd all 3 Gardasil    Molluscum contagiosum 3/2015    on biopsy, inner left thigh    Scoliosis     Screening for malignant neoplasm of the cervix 2013    Negative per pt. @ [address] [place] [address]       Past Surgical History:   Procedure Laterality Date    HX BREAST REDUCTION Bilateral 9/13/2017    BILATERAL BREAST REDUCTION POSSIBLE FREE NIPPLE GRAFT performed by Yesika Acuna MD at [facility] MA[facility]         Family History:   Problem Relation Age of Onset    Diabetes Paternal Uncle     Psychotic Disorder Father     Hypertension Maternal Grandmother     Heart Disease Maternal Grandmother     Diabetes Maternal Grandfather        Social History     Socioeconomic History    Marital status: SINGLE     Spouse name: Not on file    Number of children: Not on file    Years of education: Not on file    Highest education level: Not on file   Social Needs    Financial resource strain: Not on file    Food insecurity - worry: Not on file    Food insecurity - inability: Not on file   SUPENTA needs - medical: Not on file   SUPENTA needs - non-medical: Not on file   Occupational History    Not on file   Tobacco Use    Smoking status: Never Smoker    Smokeless tobacco: Never Used   Substance and Sexual Activity    Alcohol use: Yes     Comment: socially    Drug use: No    Sexual activity: Not Currently     Partners: Male     Birth control/protection: Condom   Other Topics Concern    Not on file   Social History Narrative    Not on file         ALLERGIES: Allegra [fexofenadine]    Review of Systems   Constitutional: Positive for chills, fatigue and fever. HENT: Negative for facial swelling and sore throat. Eyes: Negative for visual disturbance. Respiratory: Negative for cough and chest tightness. Cardiovascular: Negative for chest pain. Gastrointestinal: Negative for abdominal pain and diarrhea. Genitourinary: Positive for dysuria. Negative for difficulty urinating. Musculoskeletal: Positive for arthralgias and back pain. Skin: Negative for rash. Neurological: Positive for weakness and headaches. Negative for dizziness. Hematological: Negative for adenopathy. Psychiatric/Behavioral: Negative for suicidal ideas. All other systems reviewed and are negative.       Vitals:    12/14/18 2057   Pulse: (!) 152   SpO2: 100% Physical Exam   Constitutional: She is oriented to person, place, and time. She appears well-developed and well-nourished. No distress. HENT:   Head: Normocephalic and atraumatic. Mouth/Throat: Oropharynx is clear and moist.   Eyes: Pupils are equal, round, and reactive to light. No scleral icterus. Neck: Normal range of motion. Neck supple. No thyromegaly present. Cardiovascular: Regular rhythm, normal heart sounds and intact distal pulses. Tachycardia present. No murmur heard. Pt is tachycardiac. Pulmonary/Chest: Effort normal and breath sounds normal. No respiratory distress. Abdominal: Soft. Bowel sounds are normal. She exhibits no distension. There is no tenderness. Musculoskeletal: Normal range of motion. She exhibits no edema. Neurological: She is alert and oriented to person, place, and time. Skin: Skin is warm and dry. No rash noted. She is not diaphoretic. Nursing note and vitals reviewed. Note written by Leslie Prabhakar, as dictated by Ventura Cole MD 9:20 PM    MDM  Number of Diagnoses or Management Options  Fever, unspecified fever cause:   Pyelonephritis:   Diagnosis management comments: A:  Fever and body aches beginning today. Influenza?  + dark urine as well. UTI? Febrile and tachycardic in ED. Exam otherwise reassuring. Labs and UA consistent with UTI/pyelonephritis. VS improved. Ceftriaxone given in ED. Stable for discharge on keflex. Ibuprofen for fever. F/u with PCP.     Procedures

## 2018-12-16 LAB
BACTERIA SPEC CULT: ABNORMAL
CC UR VC: ABNORMAL
SERVICE CMNT-IMP: ABNORMAL

## 2019-07-24 ENCOUNTER — HOSPITAL ENCOUNTER (EMERGENCY)
Age: 24
Discharge: HOME | End: 2019-07-24
Attending: EMERGENCY MEDICINE
Payer: COMMERCIAL

## 2019-07-24 ENCOUNTER — APPOINTMENT (OUTPATIENT)
Dept: GENERAL RADIOLOGY | Age: 24
End: 2019-07-24
Attending: NURSE PRACTITIONER
Payer: COMMERCIAL

## 2019-07-24 VITALS
TEMPERATURE: 98.2 F | HEART RATE: 89 BPM | SYSTOLIC BLOOD PRESSURE: 119 MMHG | RESPIRATION RATE: 15 BRPM | DIASTOLIC BLOOD PRESSURE: 79 MMHG | OXYGEN SATURATION: 100 %

## 2019-07-24 DIAGNOSIS — R30.0 DYSURIA: Primary | ICD-10-CM

## 2019-07-24 DIAGNOSIS — R06.02 SOB (SHORTNESS OF BREATH): ICD-10-CM

## 2019-07-24 LAB
ALBUMIN SERPL-MCNC: 4 G/DL (ref 3.5–5)
ALBUMIN/GLOB SERPL: 1.1 {RATIO} (ref 1.1–2.2)
ALP SERPL-CCNC: 95 U/L (ref 45–117)
ALT SERPL-CCNC: 17 U/L (ref 12–78)
ANION GAP SERPL CALC-SCNC: 7 MMOL/L (ref 5–15)
APPEARANCE UR: ABNORMAL
AST SERPL-CCNC: 18 U/L (ref 15–37)
BACTERIA URNS QL MICRO: NEGATIVE /HPF
BASOPHILS # BLD: 0 K/UL (ref 0–0.1)
BASOPHILS NFR BLD: 0 % (ref 0–1)
BILIRUB SERPL-MCNC: 0.2 MG/DL (ref 0.2–1)
BILIRUB UR QL: NEGATIVE
BUN SERPL-MCNC: 8 MG/DL (ref 6–20)
BUN/CREAT SERPL: 10 (ref 12–20)
CALCIUM SERPL-MCNC: 9 MG/DL (ref 8.5–10.1)
CAOX CRY URNS QL MICRO: ABNORMAL
CHLORIDE SERPL-SCNC: 113 MMOL/L (ref 97–108)
CO2 SERPL-SCNC: 20 MMOL/L (ref 21–32)
COLOR UR: ABNORMAL
COMMENT, HOLDF: NORMAL
CREAT SERPL-MCNC: 0.81 MG/DL (ref 0.55–1.02)
DIFFERENTIAL METHOD BLD: ABNORMAL
EOSINOPHIL # BLD: 0.3 K/UL (ref 0–0.4)
EOSINOPHIL NFR BLD: 3 % (ref 0–7)
EPITH CASTS URNS QL MICRO: ABNORMAL /LPF
ERYTHROCYTE [DISTWIDTH] IN BLOOD BY AUTOMATED COUNT: 14.4 % (ref 11.5–14.5)
GLOBULIN SER CALC-MCNC: 3.8 G/DL (ref 2–4)
GLUCOSE SERPL-MCNC: 89 MG/DL (ref 65–100)
GLUCOSE UR STRIP.AUTO-MCNC: NEGATIVE MG/DL
HCT VFR BLD AUTO: 39.6 % (ref 35–47)
HGB BLD-MCNC: 12.6 G/DL (ref 11.5–16)
HGB UR QL STRIP: NEGATIVE
IMM GRANULOCYTES # BLD AUTO: 0 K/UL (ref 0–0.04)
IMM GRANULOCYTES NFR BLD AUTO: 0 % (ref 0–0.5)
KETONES UR QL STRIP.AUTO: NEGATIVE MG/DL
LEUKOCYTE ESTERASE UR QL STRIP.AUTO: ABNORMAL
LYMPHOCYTES # BLD: 2.9 K/UL (ref 0.8–3.5)
LYMPHOCYTES NFR BLD: 29 % (ref 12–49)
MCH RBC QN AUTO: 32.3 PG (ref 26–34)
MCHC RBC AUTO-ENTMCNC: 31.8 G/DL (ref 30–36.5)
MCV RBC AUTO: 101.5 FL (ref 80–99)
MONOCYTES # BLD: 0.7 K/UL (ref 0–1)
MONOCYTES NFR BLD: 7 % (ref 5–13)
NEUTS SEG # BLD: 6.2 K/UL (ref 1.8–8)
NEUTS SEG NFR BLD: 61 % (ref 32–75)
NITRITE UR QL STRIP.AUTO: NEGATIVE
NRBC # BLD: 0.02 K/UL (ref 0–0.01)
NRBC BLD-RTO: 0.2 PER 100 WBC
PH UR STRIP: 5 [PH] (ref 5–8)
PLATELET # BLD AUTO: 398 K/UL (ref 150–400)
PMV BLD AUTO: 9.6 FL (ref 8.9–12.9)
POTASSIUM SERPL-SCNC: 3.4 MMOL/L (ref 3.5–5.1)
PROT SERPL-MCNC: 7.8 G/DL (ref 6.4–8.2)
PROT UR STRIP-MCNC: NEGATIVE MG/DL
RBC # BLD AUTO: 3.9 M/UL (ref 3.8–5.2)
RBC #/AREA URNS HPF: ABNORMAL /HPF (ref 0–5)
SAMPLES BEING HELD,HOLD: NORMAL
SODIUM SERPL-SCNC: 140 MMOL/L (ref 136–145)
SP GR UR REFRACTOMETRY: 1.03 (ref 1–1.03)
UR CULT HOLD, URHOLD: NORMAL
UROBILINOGEN UR QL STRIP.AUTO: 0.2 EU/DL (ref 0.2–1)
WBC # BLD AUTO: 10.1 K/UL (ref 3.6–11)
WBC URNS QL MICRO: ABNORMAL /HPF (ref 0–4)

## 2019-07-24 PROCEDURE — 71046 X-RAY EXAM CHEST 2 VIEWS: CPT

## 2019-07-24 PROCEDURE — 80053 COMPREHEN METABOLIC PANEL: CPT

## 2019-07-24 PROCEDURE — 36415 COLL VENOUS BLD VENIPUNCTURE: CPT

## 2019-07-24 PROCEDURE — 81001 URINALYSIS AUTO W/SCOPE: CPT

## 2019-07-24 PROCEDURE — 99282 EMERGENCY DEPT VISIT SF MDM: CPT

## 2019-07-24 PROCEDURE — 85025 COMPLETE CBC W/AUTO DIFF WBC: CPT

## 2019-07-24 RX ORDER — CETIRIZINE HCL 10 MG
10 TABLET ORAL DAILY
COMMUNITY

## 2019-07-24 RX ORDER — LORAZEPAM 1 MG/1
TABLET ORAL
Refills: 0 | COMMUNITY
Start: 2019-07-20 | End: 2020-04-03 | Stop reason: SDUPTHER

## 2019-07-24 RX ORDER — DEXTROAMPHETAMINE SACCHARATE, AMPHETAMINE ASPARTATE, DEXTROAMPHETAMINE SULFATE AND AMPHETAMINE SULFATE 5; 5; 5; 5 MG/1; MG/1; MG/1; MG/1
TABLET ORAL
Refills: 0 | COMMUNITY
Start: 2019-07-20 | End: 2020-08-10 | Stop reason: SDUPTHER

## 2019-07-24 RX ORDER — TOPIRAMATE 50 MG/1
50 TABLET, FILM COATED ORAL 2 TIMES DAILY
COMMUNITY
End: 2020-10-19

## 2019-07-24 NOTE — ED TRIAGE NOTES
Patient arrives for shortness of breath that has been going for \"a couple of days\". Patient states, \"My boyfriend wont tell me who he got it from, we've been dating for 2 years. \"     + weight loss \"from my depression medication\"    Denies chest pain, dizziness, night sweats, coughs, coughing up blood

## 2019-07-24 NOTE — ED PROVIDER NOTES
Initial Complaint: SOB, dysuria, phlegm in the throat, possible TB exposure    Started: Couple day ago    Endorses: mucous in throat, SOB, dysuria, constipation  Denies: F/C, N/V, CP, cough, night sweats    Patient states her fiancé was exposed to a known TB patient. This was over the last few weeks. She states her fiancé will give her very little information regarding the exposure. Made better: nothing  Made worse: nothing    No further complaints. Past Medical History:  No date: ADHD (attention deficit hyperactivity disorder)      Comment:  Not medicated  No date: Anxiety  No date: Depression  No date: Eczema      Comment:  Ran out of her Rx  No date: HPV vaccine counseling      Comment:  Rec'd all 3 Gardasil  3/2015: Molluscum contagiosum      Comment:  on biopsy, inner left thigh  No date: Scoliosis  2013: Screening for malignant neoplasm of the cervix      Comment:  Negative per pt. @ [address] [place] [address]  Past Surgical History:  9/13/2017: HX BREAST REDUCTION; Bilateral      Comment:  BILATERAL BREAST REDUCTION POSSIBLE FREE NIPPLE GRAFT                performed by Elba Sim MD at [facility] MAIN OR  Reviewed      Primary care provider: Melinda Cooley MD      The history is provided by the patient. No  was used.       Past Medical History:   Diagnosis Date    ADHD (attention deficit hyperactivity disorder)     Not medicated    Anxiety     Depression     Eczema     Ran out of her Rx    HPV vaccine counseling     Rec'd all 3 Gardasil    Molluscum contagiosum 3/2015    on biopsy, inner left thigh    Scoliosis     Screening for malignant neoplasm of the cervix 2013    Negative per pt. @ [address] [place] [address]     Past Surgical History:   Procedure Laterality Date    HX BREAST REDUCTION Bilateral 9/13/2017    BILATERAL BREAST REDUCTION POSSIBLE FREE NIPPLE GRAFT performed by Elba Sim MD at [facility] MAIN OR       Family History:   Problem Relation Age of Onset    Diabetes Paternal Uncle     Psychotic Disorder Father     Hypertension Maternal Grandmother     Heart Disease Maternal Grandmother     Diabetes Maternal Grandfather      Social History     Socioeconomic History    Marital status: SINGLE     Spouse name: Not on file    Number of children: Not on file    Years of education: Not on file    Highest education level: Not on file   Occupational History    Not on file   Social Needs    Financial resource strain: Not on file    Food insecurity:     Worry: Not on file     Inability: Not on file    Transportation needs:     Medical: Not on file     Non-medical: Not on file   Tobacco Use    Smoking status: Never Smoker    Smokeless tobacco: Never Used   Substance and Sexual Activity    Alcohol use: Yes     Comment: socially    Drug use: No    Sexual activity: Not Currently     Partners: Male     Birth control/protection: Condom   Lifestyle    Physical activity:     Days per week: Not on file     Minutes per session: Not on file    Stress: Not on file   Relationships    Social connections:     Talks on phone: Not on file     Gets together: Not on file     Attends Synagogue service: Not on file     Active member of club or organization: Not on file     Attends meetings of clubs or organizations: Not on file     Relationship status: Not on file    Intimate partner violence:     Fear of current or ex partner: Not on file     Emotionally abused: Not on file     Physically abused: Not on file     Forced sexual activity: Not on file   Other Topics Concern    Not on file   Social History Narrative    Not on file     ALLERGIES: Allegra [fexofenadine]    Review of Systems   Constitutional: Negative for activity change, chills and fever. HENT: Positive for congestion. Respiratory: Positive for shortness of breath. Negative for cough. Cardiovascular: Negative for chest pain. Gastrointestinal: Positive for constipation. Negative for diarrhea, nausea and vomiting. Genitourinary: Positive for dysuria. Negative for hematuria. Musculoskeletal: Negative. Psychiatric/Behavioral: Negative. All other systems reviewed and are negative. Vitals:    07/24/19 1008 07/24/19 1048   BP:  119/86   Pulse: 87 86   Resp:  18   SpO2: 97% 99%          Physical Exam   Constitutional: She is oriented to person, place, and time. Vital signs are normal. She appears well-developed and well-nourished. She is active and cooperative. Non-toxic appearance. She does not have a sickly appearance. She does not appear ill. No distress. 40-year-old female in no apparent distress   HENT:   Head: Normocephalic and atraumatic. Neck: Normal range of motion. Neck supple. Cardiovascular: Normal rate, regular rhythm, normal heart sounds and intact distal pulses. Pulmonary/Chest: Effort normal and breath sounds normal. No accessory muscle usage or stridor. No respiratory distress. She has no decreased breath sounds. She has no wheezes. She has no rhonchi. She has no rales. She exhibits no tenderness. Abdominal: Soft. Normal appearance and bowel sounds are normal. There is no tenderness. There is no rigidity, no rebound, no guarding and no CVA tenderness. Musculoskeletal: Normal range of motion. Neurological: She is alert and oriented to person, place, and time. Skin: Skin is warm and dry. No erythema. Psychiatric: She has a normal mood and affect. Her behavior is normal. Judgment and thought content normal.   Nursing note and vitals reviewed.      MDM     Procedures      Assessment & Plan:     Orders Placed This Encounter    URINE CULTURE HOLD SAMPLE    XR CHEST PA LAT    URINALYSIS W/MICROSCOPIC    CBC WITH AUTOMATED DIFF    METABOLIC PANEL, COMPREHENSIVE    Hold Sample    dextroamphetamine-amphetamine (ADDERALL) 20 mg tablet    LORazepam (ATIVAN) 1 mg tablet    cetirizine (ZYRTEC) 10 mg tablet    topiramate (TOPAMAX) 50 mg tablet       Discussed with María Elena Hamilton, ,ED Provider    Shannan Perkins NP  07/24/19  10:26 AM      Call placed to [facility], [phone] [phone]. Left message for an ASAP call back. Patient has been placed in a negative pressure room in the department. Shannan Perkins NP  07/24/19  10:48 AM    Spoke with Mr. Kings Blevins    Refer to [address] for TB testing. Low risk when not having symptoms. She should schedule an appointment for TB screen. He gave me his direct phone number for her to call and speak with him directly. [phone]. Shannan Perkins NP  07/24/19  11:43 AM    UA without acute findings. Labs without acute changes. Chest x-ray clear without indication of source of shortness of breath. Will discharge patient home with follow-up with the [facility]. Patient is to speak directly to Brandonnathaniel Robisonvicky the nurse in charge of the tuberculosis program.  Discussed return precautions. 2:27 PM  Patient re-evaluated. All questions answered. Patient appropriate for discharge. Given return precautions and follow up instructions. LABORATORY TESTS:  Labs Reviewed   URINALYSIS W/MICROSCOPIC - Abnormal; Notable for the following components:       Result Value    Appearance CLOUDY (*)     Leukocyte Esterase SMALL (*)     CA Oxalate crystals FEW (*)     All other components within normal limits   CBC WITH AUTOMATED DIFF - Abnormal; Notable for the following components:    .5 (*)     NRBC 0.2 (*)     ABSOLUTE NRBC 0.02 (*)     All other components within normal limits   METABOLIC PANEL, COMPREHENSIVE - Abnormal; Notable for the following components:    Potassium 3.4 (*)     Chloride 113 (*)     CO2 20 (*)     BUN/Creatinine ratio 10 (*)     All other components within normal limits   URINE CULTURE HOLD SAMPLE   SAMPLES BEING HELD       IMAGING RESULTS:  Xr Chest Pa Lat    Result Date: 7/24/2019  Exam:  2 view chest Indication: Shortness of breath Comparison to 5/21/2012.  PA and lateral views demonstrate normal heart size. There is no acute process in the lung fields. The osseous structures are unremarkable. Impression: No acute process or change compared to the prior exam.       MEDICATIONS GIVEN:  Medications - No data to display    IMPRESSION:  1. Dysuria    2. SOB (shortness of breath)        PLAN:  1. Current Discharge Medication List        2. Follow-up Information     Follow up With Specialties Details Why [address] [place] [address]  Schedule an appointment as soon as possible for a visit Call [phone] to schedule TB appointment. Call Maritza Michelle at [phone] [facility] [phone]    Corina Barajas MD Internal Medicine Schedule an appointment as soon as possible for a visit  [address] [place] [address] [place] [address] EMERGENCY [address] [facility] Emergency Medicine  As needed, If symptoms worsen [facility]  [phone]        3. Return to ED for new or worsening symptoms       Donna Abbott NP      Please note that this dictation was completed with [employer], the computer voice recognition software. Quite often unanticipated grammatical, syntax, homophones, and other interpretive errors are inadvertently transcribed by the computer software. Please disregard these errors. Please excuse any errors that have escaped final proofreading.

## 2019-07-24 NOTE — DISCHARGE INSTRUCTIONS
Thank you for allowing us to care for you today. Please follow-up with your Primary Care provider in the next 2-3 days if your symptoms do not improve. Plan for home:     Follow-up with the [facility] as directed below. Before you schedule a screening visit call Jack Ruff at [phone]. He would like to talk with you and obtain further information before you set up your TB screen. Come back to the ER if you have worsening symptoms, fevers over 100.9, shaking chills, nausea or vomiting. Patient Education        Learning About Tuberculosis (TB)  What is tuberculosis (TB)? Tuberculosis (TB) is a serious disease caused by a type of bacteria that is spread through the air. TB is easily spread from person to person through coughs or sneezes. It usually occurs in the lungs, but it can spread to other parts of the body. What is latent TB? Latent TB means that you have bacteria in your body that could cause active TB, but your body's defenses (immune system) are keeping it from turning into active TB. You don't have any symptoms, so you don't feel sick. And you can't spread it to others. While this means that you don't have active TB, you can develop the disease at some point if you don't have treatment. What is active TB? You get active TB (TB disease) when the bacteria that cause the disease overcome your body's defenses. Some people get active TB right away. Others may develop it later if the immune system gets weak. When you have active TB, you can spread the disease to others. What are the symptoms of active TB? People with active TB may:  · Cough. · Lose their appetite and lose weight. · Have night sweats. · Have a fever. · Have chills. · Feel tired and weak. How is it diagnosed? Latent TB  You may have a skin or blood test to find out if you have latent TB. Skin tests are used most often. For a skin test, a small needle places testing material under your skin.  In a few days, you will go back to your testing place to be checked for a skin reaction. For a blood test, your blood is taken and tested. Active TB  You will start with a skin or blood test. If that test shows that you have been exposed to TB, you will have an X-ray of your chest. The X-ray will show if there is damage to your lungs. And you may be asked to cough up some mucus that is tested in a lab. This test shows if TB is in your lungs. If it is, you can spread it to others by coughing or even just breathing out. How is it treated? Latent TB  If you have latent TB, you may be treated with one or more antibiotics for 3 to 9 months. A health professional may watch you take your medicine. This is called directly observed therapy (DOT). DOT is done to help make sure that you don't miss a dose and that all the bacteria are killed. Active TB  The first phase of treatment for active TB lasts 2 months. During this phase, you take several different medicines. The second phase of treatment can last 4 to 7 months or longer. During this phase, the number of medicines you take may be reduced. Treatment for TB takes a long time because the bacteria die very slowly. You will likely start to feel better after a few weeks of treatment. But don't stop your treatment until your doctor says all of the TB bacteria are dead. A health professional may watch you take your medicine to make sure that you don't miss a dose. Missing doses or stopping medicine too soon can make you sick again. Then you may have to start treatment over again. And the infection can be harder to treat if you have to start over. Follow-up care is a key part of your treatment and safety. Be sure to make and go to all appointments, and call your doctor if you are having problems. It's also a good idea to know your test results and keep a list of the medicines you take. Where can you learn more? Go to http://pino-dorinda.info/.   Enter M591 in the search box to learn more about \"Learning About Tuberculosis (TB). \"  Current as of: July 30, 2018  Content Version: 12.1  © 4737-4874 Healthwise, Incorporated. Care instructions adapted under license by [employer] (which disclaims liability or warranty for this information). If you have questions about a medical condition or this instruction, always ask your healthcare professional. [place] [address] any warranty or liability for your use of this information.

## 2019-11-28 ENCOUNTER — HOSPITAL ENCOUNTER (EMERGENCY)
Age: 24
Discharge: HOME | End: 2019-11-29
Attending: EMERGENCY MEDICINE
Payer: MEDICAID

## 2019-11-28 VITALS
HEART RATE: 86 BPM | HEIGHT: 66 IN | RESPIRATION RATE: 18 BRPM | OXYGEN SATURATION: 100 % | DIASTOLIC BLOOD PRESSURE: 69 MMHG | BODY MASS INDEX: 24.11 KG/M2 | TEMPERATURE: 98.1 F | WEIGHT: 150 LBS | SYSTOLIC BLOOD PRESSURE: 112 MMHG

## 2019-11-28 DIAGNOSIS — Z11.4 ENCOUNTER FOR HIV (HUMAN IMMUNODEFICIENCY VIRUS) TEST: Primary | ICD-10-CM

## 2019-11-28 LAB
BASOPHILS # BLD: 0.1 K/UL (ref 0–0.1)
BASOPHILS NFR BLD: 1 % (ref 0–1)
DIFFERENTIAL METHOD BLD: ABNORMAL
EOSINOPHIL # BLD: 0.4 K/UL (ref 0–0.4)
EOSINOPHIL NFR BLD: 3 % (ref 0–7)
ERYTHROCYTE [DISTWIDTH] IN BLOOD BY AUTOMATED COUNT: 13.4 % (ref 11.5–14.5)
HCT VFR BLD AUTO: 39.1 % (ref 35–47)
HGB BLD-MCNC: 12.8 G/DL (ref 11.5–16)
IMM GRANULOCYTES # BLD AUTO: 0 K/UL (ref 0–0.04)
IMM GRANULOCYTES NFR BLD AUTO: 0 % (ref 0–0.5)
LYMPHOCYTES # BLD: 3.8 K/UL (ref 0.8–3.5)
LYMPHOCYTES NFR BLD: 33 % (ref 12–49)
MCH RBC QN AUTO: 32.6 PG (ref 26–34)
MCHC RBC AUTO-ENTMCNC: 32.7 G/DL (ref 30–36.5)
MCV RBC AUTO: 99.5 FL (ref 80–99)
MONOCYTES # BLD: 0.7 K/UL (ref 0–1)
MONOCYTES NFR BLD: 6 % (ref 5–13)
NEUTS SEG # BLD: 6.8 K/UL (ref 1.8–8)
NEUTS SEG NFR BLD: 57 % (ref 32–75)
NRBC # BLD: 0 K/UL (ref 0–0.01)
NRBC BLD-RTO: 0 PER 100 WBC
PLATELET # BLD AUTO: 385 K/UL (ref 150–400)
PMV BLD AUTO: 9.4 FL (ref 8.9–12.9)
RBC # BLD AUTO: 3.93 M/UL (ref 3.8–5.2)
WBC # BLD AUTO: 11.7 K/UL (ref 3.6–11)

## 2019-11-28 PROCEDURE — 87340 HEPATITIS B SURFACE AG IA: CPT

## 2019-11-28 PROCEDURE — 74011250636 HC RX REV CODE- 250/636: Performed by: EMERGENCY MEDICINE

## 2019-11-28 PROCEDURE — 86705 HEP B CORE ANTIBODY IGM: CPT

## 2019-11-28 PROCEDURE — 96372 THER/PROPH/DIAG INJ SC/IM: CPT

## 2019-11-28 PROCEDURE — 86803 HEPATITIS C AB TEST: CPT

## 2019-11-28 PROCEDURE — 36415 COLL VENOUS BLD VENIPUNCTURE: CPT

## 2019-11-28 PROCEDURE — 80053 COMPREHEN METABOLIC PANEL: CPT

## 2019-11-28 PROCEDURE — 85025 COMPLETE CBC W/AUTO DIFF WBC: CPT

## 2019-11-28 PROCEDURE — 99284 EMERGENCY DEPT VISIT MOD MDM: CPT

## 2019-11-28 PROCEDURE — 74011250637 HC RX REV CODE- 250/637: Performed by: EMERGENCY MEDICINE

## 2019-11-28 PROCEDURE — 86706 HEP B SURFACE ANTIBODY: CPT

## 2019-11-28 PROCEDURE — 74011000250 HC RX REV CODE- 250: Performed by: EMERGENCY MEDICINE

## 2019-11-28 PROCEDURE — 93005 ELECTROCARDIOGRAM TRACING: CPT

## 2019-11-28 RX ORDER — AZITHROMYCIN 250 MG/1
1000 TABLET, FILM COATED ORAL
Status: COMPLETED | OUTPATIENT
Start: 2019-11-28 | End: 2019-11-28

## 2019-11-28 RX ORDER — ONDANSETRON 8 MG/1
8 TABLET, ORALLY DISINTEGRATING ORAL
Qty: 60 TAB | Refills: 0 | Status: SHIPPED | OUTPATIENT
Start: 2019-11-28 | End: 2020-04-03

## 2019-11-28 RX ORDER — EMTRICITABINE AND TENOFOVIR DISOPROXIL FUMARATE 200; 300 MG/1; MG/1
1 TABLET, FILM COATED ORAL DAILY
Qty: 28 TAB | Refills: 0 | Status: SHIPPED | OUTPATIENT
Start: 2019-11-28 | End: 2020-04-03

## 2019-11-28 RX ORDER — ONDANSETRON 4 MG/1
4 TABLET, ORALLY DISINTEGRATING ORAL
Status: COMPLETED | OUTPATIENT
Start: 2019-11-28 | End: 2019-11-28

## 2019-11-28 RX ORDER — EMTRICITABINE AND TENOFOVIR DISOPROXIL FUMARATE 200; 300 MG/1; MG/1
1 TABLET, FILM COATED ORAL ONCE
Status: COMPLETED | OUTPATIENT
Start: 2019-11-28 | End: 2019-11-28

## 2019-11-28 RX ADMIN — LIDOCAINE HYDROCHLORIDE 250 MG: 10 INJECTION, SOLUTION EPIDURAL; INFILTRATION; INTRACAUDAL; PERINEURAL at 23:37

## 2019-11-28 RX ADMIN — AZITHROMYCIN MONOHYDRATE 1000 MG: 250 TABLET ORAL at 23:36

## 2019-11-28 RX ADMIN — EMTRICITABINE AND TENOFOVIR DISOPROXIL FUMARATE 1 TABLET: 200; 300 TABLET, FILM COATED ORAL at 23:30

## 2019-11-28 RX ADMIN — RALTEGRAVIR 400 MG: 400 TABLET, FILM COATED ORAL at 23:30

## 2019-11-28 RX ADMIN — ONDANSETRON 4 MG: 4 TABLET, ORALLY DISINTEGRATING ORAL at 23:36

## 2019-11-29 ENCOUNTER — HOSPITAL ENCOUNTER (EMERGENCY)
Age: 24
Discharge: HOME | End: 2019-11-29
Attending: EMERGENCY MEDICINE | Admitting: EMERGENCY MEDICINE
Payer: MEDICAID

## 2019-11-29 VITALS
SYSTOLIC BLOOD PRESSURE: 90 MMHG | OXYGEN SATURATION: 100 % | BODY MASS INDEX: 24.11 KG/M2 | WEIGHT: 150 LBS | HEIGHT: 66 IN | HEART RATE: 73 BPM | RESPIRATION RATE: 16 BRPM | DIASTOLIC BLOOD PRESSURE: 66 MMHG | TEMPERATURE: 97.9 F

## 2019-11-29 DIAGNOSIS — T74.21XD SEXUAL ASSAULT OF ADULT, SUBSEQUENT ENCOUNTER: Primary | ICD-10-CM

## 2019-11-29 DIAGNOSIS — B37.31 CANDIDA VAGINITIS: ICD-10-CM

## 2019-11-29 LAB
ALBUMIN SERPL-MCNC: 3.8 G/DL (ref 3.5–5)
ALBUMIN/GLOB SERPL: 1 {RATIO} (ref 1.1–2.2)
ALP SERPL-CCNC: 94 U/L (ref 45–117)
ALT SERPL-CCNC: 20 U/L (ref 12–78)
ANION GAP SERPL CALC-SCNC: 4 MMOL/L (ref 5–15)
AST SERPL-CCNC: 15 U/L (ref 15–37)
ATRIAL RATE: 73 BPM
BILIRUB SERPL-MCNC: 0.3 MG/DL (ref 0.2–1)
BUN SERPL-MCNC: 8 MG/DL (ref 6–20)
BUN/CREAT SERPL: 11 (ref 12–20)
CALCIUM SERPL-MCNC: 8.7 MG/DL (ref 8.5–10.1)
CALCULATED P AXIS, ECG09: 70 DEGREES
CALCULATED R AXIS, ECG10: 77 DEGREES
CALCULATED T AXIS, ECG11: 48 DEGREES
CHLORIDE SERPL-SCNC: 111 MMOL/L (ref 97–108)
CLUE CELLS VAG QL WET PREP: NORMAL
CO2 SERPL-SCNC: 23 MMOL/L (ref 21–32)
CREAT SERPL-MCNC: 0.71 MG/DL (ref 0.55–1.02)
DIAGNOSIS, 93000: NORMAL
GLOBULIN SER CALC-MCNC: 3.8 G/DL (ref 2–4)
GLUCOSE SERPL-MCNC: 93 MG/DL (ref 65–100)
HBV CORE IGM SER QL: NONREACTIVE
HBV SURFACE AB SER QL: NONREACTIVE
HBV SURFACE AB SER-ACNC: <3.1 MIU/ML
HBV SURFACE AG SER QL: <0.1 INDEX
HBV SURFACE AG SER QL: NEGATIVE
HCG SERPL QL: NEGATIVE
HCV AB SERPL QL IA: NONREACTIVE
HCV COMMENT,HCGAC: NORMAL
HIV 1+2 AB+HIV1 P24 AG SERPL QL IA: NONREACTIVE
HIV12 RESULT COMMENT, HHIVC: NORMAL
KOH PREP SPEC: NORMAL
P-R INTERVAL, ECG05: 150 MS
POTASSIUM SERPL-SCNC: 3.7 MMOL/L (ref 3.5–5.1)
PROT SERPL-MCNC: 7.6 G/DL (ref 6.4–8.2)
Q-T INTERVAL, ECG07: 376 MS
QRS DURATION, ECG06: 74 MS
QTC CALCULATION (BEZET), ECG08: 414 MS
SERVICE CMNT-IMP: NORMAL
SODIUM SERPL-SCNC: 138 MMOL/L (ref 136–145)
T VAGINALIS VAG QL WET PREP: NORMAL
VENTRICULAR RATE, ECG03: 73 BPM

## 2019-11-29 PROCEDURE — 74011250637 HC RX REV CODE- 250/637: Performed by: PHYSICIAN ASSISTANT

## 2019-11-29 PROCEDURE — 87210 SMEAR WET MOUNT SALINE/INK: CPT

## 2019-11-29 PROCEDURE — 36415 COLL VENOUS BLD VENIPUNCTURE: CPT

## 2019-11-29 PROCEDURE — 99284 EMERGENCY DEPT VISIT MOD MDM: CPT

## 2019-11-29 PROCEDURE — 87389 HIV-1 AG W/HIV-1&-2 AB AG IA: CPT

## 2019-11-29 PROCEDURE — 84703 CHORIONIC GONADOTROPIN ASSAY: CPT

## 2019-11-29 PROCEDURE — 74011250637 HC RX REV CODE- 250/637: Performed by: EMERGENCY MEDICINE

## 2019-11-29 PROCEDURE — 86592 SYPHILIS TEST NON-TREP QUAL: CPT

## 2019-11-29 PROCEDURE — 75810000275 HC EMERGENCY DEPT VISIT NO LEVEL OF CARE

## 2019-11-29 PROCEDURE — 87491 CHLMYD TRACH DNA AMP PROBE: CPT

## 2019-11-29 RX ORDER — FLUCONAZOLE 100 MG/1
150 TABLET ORAL
Status: COMPLETED | OUTPATIENT
Start: 2019-11-29 | End: 2019-11-29

## 2019-11-29 RX ORDER — EMTRICITABINE AND TENOFOVIR DISOPROXIL FUMARATE 200; 300 MG/1; MG/1
1 TABLET, FILM COATED ORAL
Status: COMPLETED | OUTPATIENT
Start: 2019-11-29 | End: 2019-11-29

## 2019-11-29 RX ORDER — LEVONORGESTREL 1.5 MG/1
1.5 TABLET ORAL ONCE
Status: COMPLETED | OUTPATIENT
Start: 2019-11-29 | End: 2019-11-29

## 2019-11-29 RX ORDER — ONDANSETRON 4 MG/1
4 TABLET, ORALLY DISINTEGRATING ORAL
Status: COMPLETED | OUTPATIENT
Start: 2019-11-29 | End: 2019-11-29

## 2019-11-29 RX ADMIN — LEVONORGESTREL 1.5 MG: 1.5 TABLET ORAL at 19:30

## 2019-11-29 RX ADMIN — ONDANSETRON 4 MG: 4 TABLET, ORALLY DISINTEGRATING ORAL at 19:31

## 2019-11-29 RX ADMIN — FLUCONAZOLE 150 MG: 100 TABLET ORAL at 19:57

## 2019-11-29 RX ADMIN — EMTRICITABINE AND TENOFOVIR DISOPROXIL FUMARATE 1 TABLET: 200; 300 TABLET, FILM COATED ORAL at 19:57

## 2019-11-29 RX ADMIN — RALTEGRAVIR 400 MG: 400 TABLET, FILM COATED ORAL at 19:57
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
## 2019-12-02 LAB — RPR SER QL: NONREACTIVE

## 2019-12-03 LAB
C TRACH DNA SPEC QL NAA+PROBE: NEGATIVE
N GONORRHOEA DNA SPEC QL NAA+PROBE: NEGATIVE
SAMPLE TYPE: NORMAL
SERVICE CMNT-IMP: NORMAL
SPECIMEN SOURCE: NORMAL

## 2020-03-19 ENCOUNTER — HOSPITAL ENCOUNTER (EMERGENCY)
Age: 25
Discharge: HOME | End: 2020-03-19
Attending: EMERGENCY MEDICINE
Payer: MEDICAID

## 2020-03-19 ENCOUNTER — APPOINTMENT (OUTPATIENT)
Dept: GENERAL RADIOLOGY | Age: 25
End: 2020-03-19
Attending: EMERGENCY MEDICINE
Payer: MEDICAID

## 2020-03-19 VITALS
SYSTOLIC BLOOD PRESSURE: 100 MMHG | HEIGHT: 66 IN | WEIGHT: 150 LBS | OXYGEN SATURATION: 100 % | BODY MASS INDEX: 24.11 KG/M2 | TEMPERATURE: 98.2 F | RESPIRATION RATE: 16 BRPM | HEART RATE: 80 BPM | DIASTOLIC BLOOD PRESSURE: 57 MMHG

## 2020-03-19 DIAGNOSIS — J11.1 INFLUENZA-LIKE ILLNESS: Primary | ICD-10-CM

## 2020-03-19 LAB
FLUAV AG NPH QL IA: NEGATIVE
FLUBV AG NOSE QL IA: NEGATIVE

## 2020-03-19 PROCEDURE — 71045 X-RAY EXAM CHEST 1 VIEW: CPT

## 2020-03-19 PROCEDURE — 99282 EMERGENCY DEPT VISIT SF MDM: CPT

## 2020-03-19 PROCEDURE — 87804 INFLUENZA ASSAY W/OPTIC: CPT

## 2020-03-19 RX ORDER — BENZONATATE 100 MG/1
100 CAPSULE ORAL
Qty: 30 CAP | Refills: 0 | Status: SHIPPED | OUTPATIENT
Start: 2020-03-19 | End: 2020-03-26

## 2020-03-19 RX ORDER — AZITHROMYCIN 250 MG/1
250 TABLET, FILM COATED ORAL SEE ADMIN INSTRUCTIONS
Qty: 6 TAB | Refills: 0 | Status: SHIPPED | OUTPATIENT
Start: 2020-03-19 | End: 2020-04-03

## 2020-03-19 NOTE — ED TRIAGE NOTES
Mother dx with flu 2 weeks ago. In college. No travel. Cough, sinus pressure, headache, ear pain and back pain.

## 2020-03-19 NOTE — ED NOTES
Pt given discharge paperwork and educated on discharge instructions with Dr. Sharren Kayser at bedside. Pt refusing to leave stating \"yall didn't even check me for coronavirus or address my SOB. \" Explained to pt that vitals were stable, flu swab negative, and chest xray normal. Educated on symptomatic tx and self isolation.

## 2020-03-19 NOTE — ED PROVIDER NOTES
78-year-old female with history of anxiety, ADHD, presents to the emergency department stating that 2 weeks prior her mother was diagnosed with influenza A. She presents to the emergency department noting symptoms including a nonproductive cough, shortness of breath, sinus pressure, headache, and neck and back soreness for approximately 1 week. He had a fever yesterday subjectively, but now resolved. Patient is unable to further delineate the exact onset of symptoms. She has been taking Zyrtec and ibuprofen intermittently to only slight effect of her symptoms. She denies any travel or any known contact with any coronavirus patient.            Past Medical History:   Diagnosis Date    ADHD (attention deficit hyperactivity disorder)     Not medicated    Anxiety     Depression     Eczema     Ran out of her Rx    HPV vaccine counseling     Rec'd all 3 Gardasil    Molluscum contagiosum 3/2015    on biopsy, inner left thigh    Scoliosis     Screening for malignant neoplasm of the cervix 2013    Negative per pt. @ [address] [place] [address]       Past Surgical History:   Procedure Laterality Date    HX BREAST REDUCTION Bilateral 9/13/2017    BILATERAL BREAST REDUCTION POSSIBLE FREE NIPPLE GRAFT performed by Isidra Michel MD at [facility]         Family History:   Problem Relation Age of Onset    Diabetes Paternal Uncle     Psychotic Disorder Father     Hypertension Maternal Grandmother     Heart Disease Maternal Grandmother     Diabetes Maternal Grandfather        Social History     Socioeconomic History    Marital status: SINGLE     Spouse name: Not on file    Number of children: Not on file    Years of education: Not on file    Highest education level: Not on file   Occupational History    Not on file   Social Needs    Financial resource strain: Not on file    Food insecurity     Worry: Not on file     Inability: Not on file    Transportation needs     Medical: Not on file     Non-medical: Not on file Tobacco Use    Smoking status: Never Smoker    Smokeless tobacco: Never Used   Substance and Sexual Activity    Alcohol use: Yes     Comment: socially    Drug use: No    Sexual activity: Yes     Partners: Male     Birth control/protection: Condom   Lifestyle    Physical activity     Days per week: Not on file     Minutes per session: Not on file    Stress: Not on file   Relationships    Social connections     Talks on phone: Not on file     Gets together: Not on file     Attends Anabaptist service: Not on file     Active member of club or organization: Not on file     Attends meetings of clubs or organizations: Not on file     Relationship status: Not on file    Intimate partner violence     Fear of current or ex partner: Not on file     Emotionally abused: Not on file     Physically abused: Not on file     Forced sexual activity: Not on file   Other Topics Concern    Not on file   Social History Narrative    Not on file         ALLERGIES: Allegra [fexofenadine]    Review of Systems   Constitutional: Positive for activity change, fatigue and fever. Negative for appetite change and chills. HENT: Positive for congestion, rhinorrhea, sinus pressure and sore throat. Negative for sneezing and trouble swallowing. Eyes: Negative for photophobia and visual disturbance. Respiratory: Positive for cough and shortness of breath. Cardiovascular: Negative for chest pain. Gastrointestinal: Negative for abdominal pain, blood in stool, constipation, diarrhea, nausea and vomiting. Genitourinary: Negative for difficulty urinating, dysuria, flank pain, frequency, hematuria, menstrual problem, urgency, vaginal bleeding and vaginal discharge. Musculoskeletal: Positive for back pain, myalgias and neck pain. Negative for arthralgias. Skin: Negative for rash and wound. Neurological: Negative for syncope, weakness, numbness and headaches. Psychiatric/Behavioral: Negative for self-injury and suicidal ideas. All other systems reviewed and are negative. Vitals:    03/19/20 0828   BP: 114/64   Pulse: 80   Resp: 16   Temp: 98.2 °F (36.8 °C)   SpO2: 95%   Weight: 68 kg (150 lb)   Height: 5' 6\" (1.676 m)            Physical Exam  Vitals signs and nursing note reviewed. Constitutional:       General: She is not in acute distress. Appearance: Normal appearance. She is well-developed and normal weight. She is not ill-appearing or diaphoretic. HENT:      Head: Normocephalic and atraumatic. Right Ear: Tympanic membrane normal.      Left Ear: Tympanic membrane normal.      Nose: Congestion and rhinorrhea present. Mouth/Throat:      Mouth: Mucous membranes are moist.      Comments: Mildly erythematous posterior oropharynx without tonsillar exudates, asymmetric swelling, or trismus. Eyes:      Extraocular Movements: Extraocular movements intact. Conjunctiva/sclera: Conjunctivae normal.      Pupils: Pupils are equal, round, and reactive to light. Neck:      Musculoskeletal: Neck supple. Cardiovascular:      Rate and Rhythm: Normal rate and regular rhythm. Heart sounds: Normal heart sounds. Pulmonary:      Effort: Pulmonary effort is normal. No respiratory distress. Breath sounds: Normal breath sounds. Abdominal:      General: There is no distension. Palpations: Abdomen is soft. Tenderness: There is no abdominal tenderness. Musculoskeletal:         General: No tenderness. Lymphadenopathy:      Cervical: Cervical adenopathy present. Skin:     General: Skin is warm and dry. Neurological:      General: No focal deficit present. Mental Status: She is alert and oriented to person, place, and time. Cranial Nerves: No cranial nerve deficit. Sensory: No sensory deficit. Motor: No weakness. Coordination: Coordination normal.          MDM   57-year-old female presents with approximately 1 week of URI type symptoms.   Recent sick contact with influenza. Influenza swab negative. Chest x-ray was viewed by myself and read by radiology showing no acute abnormalities. Recommended self quarantine and treating this virus like potential coronavirus avoiding contact with other individuals. Rx [employer]    The patient responded poorly to this recommendation and had very repetitive and perseverative questioning patient demands antibiotics as she believes that her infection is caused by sinus infection. After lengthy discussion at the bedside describing viral symptoms the patient refuses to be discharged without antibiotics. Will Rx Z-Nishant, but prior recommendations regarding viral treatment stand.     Procedures

## 2020-03-20 ENCOUNTER — PATIENT OUTREACH (OUTPATIENT)
Dept: FAMILY MEDICINE CLINIC | Age: 25
End: 2020-03-20

## 2020-04-03 ENCOUNTER — VIRTUAL VISIT (OUTPATIENT)
Dept: FAMILY MEDICINE CLINIC | Age: 25
End: 2020-04-03

## 2020-04-03 DIAGNOSIS — M54.9 UPPER BACK PAIN: ICD-10-CM

## 2020-04-03 DIAGNOSIS — M41.9 SCOLIOSIS, UNSPECIFIED SCOLIOSIS TYPE, UNSPECIFIED SPINAL REGION: ICD-10-CM

## 2020-04-03 DIAGNOSIS — F90.9 ATTENTION DEFICIT HYPERACTIVITY DISORDER (ADHD), UNSPECIFIED ADHD TYPE: ICD-10-CM

## 2020-04-03 DIAGNOSIS — F41.9 ANXIETY: Primary | ICD-10-CM

## 2020-04-03 RX ORDER — LORAZEPAM 1 MG/1
1 TABLET ORAL 2 TIMES DAILY
Qty: 28 TAB | Refills: 0 | Status: SHIPPED | OUTPATIENT
Start: 2020-04-03 | End: 2020-04-17

## 2020-04-03 NOTE — PROGRESS NOTES
Cortney Fong  30 y.o. female  1995  [id number]    [address]  Progress Note     Encounter Date: 4/3/2020    Cortney Fong is a 30 y.o. female who was seen by synchronous (real-time) audio-video technology on 4/3/2020. She and/or her healthcare decision maker is aware that this patient-initiated Telehealth encounter is a billable service, with coverage as determined by her insurance carrier. She  is aware that she may receive a bill and has provided verbal consent to proceed: Yes    I was in the office while conducting this encounter. The patient was checked in/\"roomed\" by Kathy Raymond CMA via telephone in the office. The patient was at home during the encounter. Assessment and Plan:     Encounter Diagnoses     ICD-10-CM ICD-9-CM   1. Anxiety F41.9 300.00   2. Attention deficit hyperactivity disorder (ADHD), unspecified ADHD type F90.9 314.01   3. Upper back pain M54.9 724.5   4. Scoliosis, unspecified scoliosis type, unspecified spinal region M41.9 737.30       1. Anxiety  Verified with Dr. Jorge Leone office (after video encounter) and PDMP that patient has been taking ativan for anxiety. Requested that Dr. Jorge Leone office forward patient's records to office. Encouraged patient to schedule an appointment with psychiatry. Called patient back after appointment to inform her that I will be sending in 2 week supply of Ativan while awaiting records from office.   - LORazepam (ATIVAN) 1 mg tablet; Take 1 Tab by mouth two (2) times a day for 14 days. Max Daily Amount: 2 mg. Dispense: 28 Tab; Refill: 0    2. Attention deficit hyperactivity disorder (ADHD), unspecified ADHD type  Prior to any prescriptions for adderall, I will need to see and review patient's psychological testing. 3. Upper back pain  4. Scoliosis, unspecified scoliosis type, unspecified spinal region  Encouraged patient to apply ice or heat to area as well as stretching.  If pain is persistent, she may be referred to PT in the future. We discussed the expected course, resolution and complications of the diagnosis(es) in detail. Medication risks, benefits, costs, interactions, and alternatives were discussed as indicated. I advised her to contact the office if her condition worsens, changes or fails to improve as anticipated. She expressed understanding with the diagnosis(es) and plan. CPT Codes 04933-48517 for Established Patients may apply to this Telehealth Visit  Time-based coding, delete if not needed: I spent at least 30 minutes with this new patient, and >50% of the time was spent counseling and/or coordinating care regarding establish care/Anxiety    Pursuant to the emergency declaration under the [employer] and the [place] [address] waiver authority and the [employer] and [employer] Act, this Virtual  Visit was conducted, with patient's consent, to reduce the patient's risk of exposure to COVID-19 and provide continuity of care for an established patient. Services were provided through a video synchronous discussion virtually to substitute for in-person clinic visit. Electronically Signed: Romayne Fava, MD    Current Medications after this visit     Current Outpatient Medications   Medication Sig    LORazepam (ATIVAN) 1 mg tablet Take 1 Tab by mouth two (2) times a day for 14 days. Max Daily Amount: 2 mg.  dextroamphetamine-amphetamine (ADDERALL) 20 mg tablet     cetirizine (ZYRTEC) 10 mg tablet Take 10 mg by mouth daily.  topiramate (TOPAMAX) 50 mg tablet Take 50 mg by mouth two (2) times a day. No current facility-administered medications for this visit.       Medications Discontinued During This Encounter   Medication Reason    azithromycin (Zithromax Z-Nishant) 250 mg tablet Not A Current Medication    emtricitabine-tenofovir, TDF, (TRUVADA) 200-300 mg per tablet Not A Current Medication    ondansetron (ZOFRAN ODT) 8 mg disintegrating tablet Not A Current Medication    LORazepam (ATIVAN) 1 mg tablet Reorder     ~~~~~~~~~~~~~~~~~~~~~~~~~~~~~~~~~~~~~~~~~~~~~~    Chief Complaint   Patient presents with    Medication Refill       History of Present Illness   Zaria Rodriguez is a 30 y.o. female who presents for:      NEW PATIENT  New patient who presents to establish PCP care. I personally reviewed and updated the patient's medical, surgical, family and social history. Shira Yuan and SPECIALISTS  Dr. Sandi Wooten. Patient decided to come to [place] due to physician is no longer allowed to prescribe (?). RECORDS  Provided by patient: None    [facility]  Patient Care Team:  Lissa Man MD as PCP - General (Internal Medicine)  Marie Birmingham MD (Family Practice)  Letitia Salinas MD as Physician (Obstetrics & Gynecology)    Establish Care  Patient present with cc of [facility] care. Patient reports that she had been receiving ativan 1 mg BID for anxiety and Adderall. She believes that she had the psychological testing as an adult. She is not on any other medication. Had been on buspar in the past but she did not find it effective. Had been taking topamax for depression? But stopped due to concern for withdrawal (?)She is not seeing a psychiatrist as the appointment were too far out. Patient endorses back pain for several weeks intermittently. She has been using tylenol with relief. Pain is in the upper back. Worsen with bending over and deep breaths. She had similar pain in past with scoliosis and had breat reduction that greatly improved her symptoms. She has not been seen for this issue in several years nor has she been seen by Physical Therapy. Review of Systems   Review of Systems   Constitutional: Negative for chills, fever, malaise/fatigue and weight loss. Eyes: Positive for blurred vision. Negative for photophobia, pain and discharge.    Respiratory: Negative for cough, sputum production and shortness of breath. Gastrointestinal: Negative for abdominal pain, diarrhea, nausea and vomiting. Musculoskeletal: Positive for back pain. Negative for falls, joint pain and myalgias. Neurological: Positive for headaches. Negative for tingling, tremors, seizures and loss of consciousness. Psychiatric/Behavioral: Negative for depression, memory loss and suicidal ideas. The patient is nervous/anxious and has insomnia. Vitals/Objective:     Due to this being a TeleHealth evaluation, many elements of the physical examination are unable to be assessed. Physical Exam  Constitutional:       General: She is not in acute distress. Appearance: Normal appearance. She is not ill-appearing. HENT:      Head: Normocephalic and atraumatic. Right Ear: External ear normal.      Left Ear: External ear normal.      Mouth/Throat:      Mouth: Mucous membranes are moist.   Eyes:      General:         Right eye: No discharge. Left eye: No discharge. Extraocular Movements: Extraocular movements intact. Conjunctiva/sclera: Conjunctivae normal.   Neck:      Musculoskeletal: Normal range of motion. Pulmonary:      Effort: Pulmonary effort is normal.      Comments: No visualized signs of difficulty breathing or respiratory distress  Skin:     Coloration: Skin is not pale. Findings: No erythema or rash. Neurological:      Mental Status: She is alert and oriented to person, place, and time. Cranial Nerves: No cranial nerve deficit ( No Facial Asymmetry (Cranial nerve 7 motor function) (limited exam due to video visit) ). Comments: Able to follow commands    Psychiatric:         Mood and Affect: Mood normal.         Behavior: Behavior normal.          No results found for this or any previous visit (from the past 24 hour(s)). Disposition     No future appointments.     History   Patient's past medical, surgical and family histories were reviewed and updated.     Past Medical History:   Diagnosis Date    ADHD     Anxiety     Depression     Eczema     Ran out of her Rx    Molluscum contagiosum 3/2015    on biopsy, inner left thigh    Scoliosis     Screening for malignant neoplasm of the cervix 2013    Negative per pt. @ [facility]     Past Surgical History:   Procedure Laterality Date    HX BREAST REDUCTION Bilateral 9/13/2017    BILATERAL BREAST REDUCTION POSSIBLE FREE NIPPLE GRAFT performed by Orin Lopez MD at [facility] MAIN [facility]     Family History   Problem Relation Age of Onset    Diabetes Paternal Uncle     Psychotic Disorder Father     Hypertension Maternal Grandmother     Heart Disease Maternal Grandmother     Diabetes Maternal Grandfather      Social History     Tobacco Use    Smoking status: Never Smoker    Smokeless tobacco: Never Used   Substance Use Topics    Alcohol use: Yes     Comment: socially    Drug use: No       Allergies     Allergies   Allergen Reactions    Allegra [Fexofenadine] Itching and Vertigo

## 2020-04-03 NOTE — PROGRESS NOTES
Identified pt with two pt identifiers(name and ). Reviewed record in preparation for visit and have obtained necessary documentation. Chief Complaint   Patient presents with    Medication Refill        Health Maintenance Due   Topic    HPV Age 9Y-34Y (1 - 2-dose series)    DTaP/Tdap/Td series (1 - Tdap)    PAP AKA CERVICAL CYTOLOGY        Coordination of Care Questionnaire:  :   1) Have you been to an emergency room, urgent care, or hospitalized since your last visit? If yes, where when, and reason for visit? Yes, [facility] for back pain      2. Have seen or consulted any other health care provider since your last visit? If yes, where when, and reason for visit? Dr. Austyn Bradshaw         Patient is accompanied by self  I have received verbal consent from Cassie Retana to discuss any/all medical information while they are present in the room.

## 2020-05-05 ENCOUNTER — TELEPHONE (OUTPATIENT)
Dept: FAMILY MEDICINE CLINIC | Age: 25
End: 2020-05-05

## 2020-05-05 NOTE — TELEPHONE ENCOUNTER
Patient will need appointment for controlled substance refill    I have also not received the patient's ADHD testing paperwork. I informed her that without the record of testing I will not be prescribing adderall. She may need to complete a release somehow as it was not included in papers I received. 12:19 PM  Psychological assess from [address] [place] [address] on March 3, 2020 from when patient was 10years old. Had been on Vyvanse prior to 10/14/17      Given that the patient's psychological testing was done when she was a minor, she will need testing as an adult before I will be willing to prescribe Adderall. If she needs I can provide names of provider who do testing such as Dr. Dhara Escobedo of the [facility]. I am willing to prescribe ativan though she will need an appointment to discuss.        Kenney Anna MD

## 2020-05-05 NOTE — TELEPHONE ENCOUNTER
----- Message from Sebastien Huntley sent at 5/4/2020  5:07 PM EDT -----  Regarding: Dr. Arley Melton Medication Refill    Caller (if not patient):      Relationship of caller (if not patient):      Best contact number(s): [phone]      Name of medication and dosage if known:  Adderall 20mg, Lorazepam 1mg       Is patient out of this medication (yes/no): Yes       Pharmacy name: [facility] listed in chart? (yes/no): Yes     Pharmacy phone number: [phone]      Details to clarify the request:      Sebastien Huntley

## 2020-05-07 ENCOUNTER — VIRTUAL VISIT (OUTPATIENT)
Dept: FAMILY MEDICINE CLINIC | Age: 25
End: 2020-05-07

## 2020-05-07 DIAGNOSIS — F41.9 ANXIETY: Primary | ICD-10-CM

## 2020-05-07 DIAGNOSIS — F90.9 ATTENTION DEFICIT HYPERACTIVITY DISORDER (ADHD), UNSPECIFIED ADHD TYPE: ICD-10-CM

## 2020-05-07 DIAGNOSIS — M41.9 SCOLIOSIS, UNSPECIFIED SCOLIOSIS TYPE, UNSPECIFIED SPINAL REGION: ICD-10-CM

## 2020-05-07 DIAGNOSIS — M54.9 UPPER BACK PAIN: ICD-10-CM

## 2020-05-07 RX ORDER — LORAZEPAM 1 MG/1
1 TABLET ORAL
Qty: 60 TAB | Refills: 0 | Status: SHIPPED | OUTPATIENT
Start: 2020-06-07 | End: 2020-05-22 | Stop reason: SDUPTHER

## 2020-05-07 RX ORDER — LORAZEPAM 1 MG/1
TABLET ORAL
COMMUNITY
Start: 2019-07-20 | End: 2020-05-07 | Stop reason: SDUPTHER

## 2020-05-07 RX ORDER — CYCLOBENZAPRINE HCL 10 MG
10 TABLET ORAL
Qty: 30 TAB | Refills: 3 | Status: SHIPPED | OUTPATIENT
Start: 2020-05-07

## 2020-05-07 RX ORDER — LORAZEPAM 1 MG/1
1 TABLET ORAL
Qty: 60 TAB | Refills: 0 | Status: SHIPPED | OUTPATIENT
Start: 2020-07-07 | End: 2020-08-05

## 2020-05-07 RX ORDER — LORAZEPAM 1 MG/1
1 TABLET ORAL
Qty: 60 TAB | Refills: 0 | Status: SHIPPED | OUTPATIENT
Start: 2020-05-07 | End: 2020-05-22 | Stop reason: SDUPTHER

## 2020-05-07 NOTE — PROGRESS NOTES
Patient stated name &     Chief Complaint   Patient presents with    Medication Refill     Adderall, Lorazepam,  Cyclobenzaprine        Health Maintenance Due   Topic    HPV Age 9Y-26Y (1 - 2-dose series)    DTaP/Tdap/Td series (1 - Tdap)    PAP AKA CERVICAL CYTOLOGY        Wt Readings from Last 3 Encounters:   20 150 lb (68 kg)   19 150 lb (68 kg)   19 150 lb (68 kg)     Temp Readings from Last 3 Encounters:   20 98.2 °F (36.8 °C)   19 97.9 °F (36.6 °C)   19 98.1 °F (36.7 °C)     BP Readings from Last 3 Encounters:   20 100/57   19 90/66   19 112/69     Pulse Readings from Last 3 Encounters:   20 80   19 73   19 86         Learning Assessment:  :     No flowsheet data found. Depression Screening:  :     No flowsheet data found. Fall Risk Assessment:  :     No flowsheet data found. Abuse Screening:  :     No flowsheet data found. Coordination of Care Questionnaire:  :     1) Have you been to an emergency room, urgent care clinic since your last visit? No  Hospitalized since your last visit? No             2) Have you seen or consulted any other health care providers outside of [address] [place] [address] since your last visit? No  (Include any pap smears or colon screenings in this section.)    Patient is accompanied by No I have received verbal consent from Aurea Sandifer to discuss any/all medical information while they are present in the room.

## 2020-05-07 NOTE — PROGRESS NOTES
Judie Ramírez  30 y.o. female  1995  [id number]    [address]  Progress Note     Encounter Date: 5/7/2020    Judie Ramírez is a 30 y.o. female who was seen by synchronous (real-time) audio-video technology on 5/7/2020. She and/or her healthcare decision maker is aware that this patient-initiated Telehealth encounter is a billable service, with coverage as determined by her insurance carrier. She  is aware that she may receive a bill and has provided verbal consent to proceed: Yes    I was at home while conducting this encounter. The patient was checked in/\"roomed\" by Basilio Segovia CMA via telephone in the office. The patient was at home during the encounter. This visit was completed virtually using Doxy. me  Assessment and Plan:     Encounter Diagnoses     ICD-10-CM ICD-9-CM   1. Anxiety F41.9 300.00   2. Upper back pain M54.9 724.5   3. Scoliosis, unspecified scoliosis type, unspecified spinal region M41.9 737.30   4. Attention deficit hyperactivity disorder (ADHD), unspecified ADHD type F90.9 314.01       1. Anxiety  Patient is still attempting to find psychologist for counseling. PDMP reviewed and appropriate. 3-months prescription of lorazepam sent to pharmacy. - LORazepam (ATIVAN) 1 mg tablet; Take 1 Tab by mouth two (2) times daily as needed for Anxiety for up to 29 days. Max Daily Amount: 2 mg. Dispense: 60 Tab; Refill: 0  - LORazepam (ATIVAN) 1 mg tablet; Take 1 Tab by mouth two (2) times daily as needed for Anxiety for up to 29 days. Max Daily Amount: 2 mg. Dispense: 60 Tab; Refill: 0  - LORazepam (ATIVAN) 1 mg tablet; Take 1 Tab by mouth two (2) times daily as needed for Anxiety for up to 29 days. Max Daily Amount: 2 mg. Dispense: 60 Tab; Refill: 0    2. Upper back pain  3. Scoliosis, unspecified scoliosis type, unspecified spinal region  Patient advised to continue NSAIDs prn as well   - cyclobenzaprine (FLEXERIL) 10 mg tablet;  Take 1 Tab by mouth two (2) times daily as needed for Muscle Spasm(s). Dispense: 30 Tab; Refill: 3    4. ADHD  Advised patient as explained before that prior to prescribing she will need to have adult psychological testing. She is in the process of setting up an appointment. We discussed the expected course, resolution and complications of the diagnosis(es) in detail. Medication risks, benefits, costs, interactions, and alternatives were discussed as indicated. I advised her to contact the office if her condition worsens, changes or fails to improve as anticipated. She expressed understanding with the diagnosis(es) and plan. CPT Codes 54754-25630 for Established Patients may apply to this Telehealth Visit      Pursuant to the emergency declaration under the [address] [place] [address], [facility] waiver authority and the [employer] Act, this Virtual  Visit was conducted, with patient's consent, to reduce the patient's risk of exposure to COVID-19 and provide continuity of care for an established patient. Services were provided through a video synchronous discussion virtually to substitute for in-person clinic visit. Electronically Signed: Mignon Arreola MD    Current Medications after this visit     Current Outpatient Medications   Medication Sig    LORazepam (ATIVAN) 1 mg tablet Take 1 Tab by mouth two (2) times daily as needed for Anxiety for up to 29 days. Max Daily Amount: 2 mg.  [START ON 6/7/2020] LORazepam (ATIVAN) 1 mg tablet Take 1 Tab by mouth two (2) times daily as needed for Anxiety for up to 29 days. Max Daily Amount: 2 mg.  [START ON 7/7/2020] LORazepam (ATIVAN) 1 mg tablet Take 1 Tab by mouth two (2) times daily as needed for Anxiety for up to 29 days. Max Daily Amount: 2 mg.  cyclobenzaprine (FLEXERIL) 10 mg tablet Take 1 Tab by mouth two (2) times daily as needed for Muscle Spasm(s).     dextroamphetamine-amphetamine (ADDERALL) 20 mg tablet     cetirizine (ZYRTEC) 10 mg tablet Take 10 mg by mouth daily.  topiramate (TOPAMAX) 50 mg tablet Take 50 mg by mouth two (2) times a day. No current facility-administered medications for this visit. Medications Discontinued During This Encounter   Medication Reason    LORazepam (ATIVAN) 1 mg tablet Reorder     ~~~~~~~~~~~~~~~~~~~~~~~~~~~~~~~~~~~~~~~~~~~~~~    Chief Complaint   Patient presents with    Medication Refill     Adderall, Lorazepam,  Cyclobenzaprine       History of Present Illness   Xavier Bosworth is a 30 y.o. female who presents for:    Anxeity  Patient present with cc of anxiety. PDMP reviewed today. Last fill on lorazepam  was 04/03/2020 for #28 tablets. ADHD  Patient had been diagnosis in teens and been receiving Adderall from Dr. Madonna Hendrix. She has not complete psychological testing as an adult. She has been advised in the past but reports that she did not schedule an appointment because appointment were for June/July and she felt that was too far. Back pain/spasm  Patient reports that she has been issues with back pain and spasms. She denies numbness or tinging in back or legs. She has been treating with tylenol and cyclobenzaprine. Review of Systems   Review of Systems   Constitutional: Negative for chills and fever. HENT: Negative for congestion, ear discharge and sore throat. Eyes: Negative for double vision, photophobia and discharge. Respiratory: Negative for cough, sputum production, shortness of breath and wheezing. Cardiovascular: Negative for chest pain, palpitations and leg swelling. Gastrointestinal: Negative for diarrhea, nausea and vomiting. Genitourinary: Negative for dysuria and urgency. Musculoskeletal: Positive for back pain and myalgias. Negative for falls, joint pain and neck pain. Skin: Negative. Neurological: Negative for dizziness, tingling, tremors, speech change, focal weakness, seizures, weakness and headaches. Psychiatric/Behavioral: Negative for depression, hallucinations, memory loss, substance abuse and suicidal ideas. The patient is nervous/anxious and has insomnia. Vitals/Objective:     Due to this being a TeleHealth evaluation, many elements of the physical examination are unable to be assessed. Physical Exam  Constitutional:       General: She is not in acute distress. Appearance: Normal appearance. She is normal weight. She is not ill-appearing, toxic-appearing or diaphoretic. HENT:      Head: Normocephalic and atraumatic. Right Ear: External ear normal.      Left Ear: External ear normal.      Mouth/Throat:      Mouth: Mucous membranes are moist.   Eyes:      General:         Right eye: No discharge. Left eye: No discharge. Extraocular Movements: Extraocular movements intact. Conjunctiva/sclera: Conjunctivae normal.   Neck:      Musculoskeletal: Normal range of motion. Pulmonary:      Effort: Pulmonary effort is normal.      Comments: No visualized signs of difficulty breathing or respiratory distress  Skin:     Coloration: Skin is not pale. Findings: No erythema or rash. Neurological:      Mental Status: She is alert and oriented to person, place, and time. Cranial Nerves: No cranial nerve deficit ( No Facial Asymmetry (Cranial nerve 7 motor function) (limited exam due to video visit) ). Comments: Able to follow commands    Psychiatric:         Mood and Affect: Mood normal.         Behavior: Behavior normal.          No results found for this or any previous visit (from the past 24 hour(s)). Disposition     No future appointments. History   Patient's past medical, surgical and family histories were reviewed and updated.     Past Medical History:   Diagnosis Date    ADHD     Anxiety     Depression     Eczema     Ran out of her Rx    Molluscum contagiosum 3/2015    on biopsy, inner left thigh    Scoliosis     Screening for malignant neoplasm of the cervix 2013    Negative per pt. @ [facility]     Past Surgical History:   Procedure Laterality Date    HX BREAST REDUCTION Bilateral 9/13/2017    BILATERAL BREAST REDUCTION POSSIBLE FREE NIPPLE GRAFT performed by Star Garcia MD at [facility]     Family History   Problem Relation Age of Onset    Diabetes Paternal Uncle     Psychotic Disorder Father     Hypertension Maternal Grandmother     Heart Disease Maternal Grandmother     Diabetes Maternal Grandfather      Social History     Tobacco Use    Smoking status: Never Smoker    Smokeless tobacco: Never Used   Substance Use Topics    Alcohol use: Yes     Comment: socially    Drug use: No       Allergies     Allergies   Allergen Reactions    Allegra [Fexofenadine] Itching and Vertigo

## 2020-05-15 ENCOUNTER — VIRTUAL VISIT (OUTPATIENT)
Dept: FAMILY MEDICINE CLINIC | Age: 25
End: 2020-05-15

## 2020-05-15 VITALS — BODY MASS INDEX: 24.11 KG/M2 | WEIGHT: 150 LBS | HEIGHT: 66 IN

## 2020-05-15 DIAGNOSIS — F90.0 ATTENTION DEFICIT HYPERACTIVITY DISORDER (ADHD), PREDOMINANTLY INATTENTIVE TYPE: Primary | ICD-10-CM

## 2020-05-15 NOTE — PROGRESS NOTES
Deshawn Johnson  30 y.o. female  1995  [id number]    [address]  Progress Note     Encounter Date: 5/15/2020    Deshawn Johnson is a 30 y.o. female who was seen by synchronous (real-time) audio-video technology on 5/15/2020. She and/or her healthcare decision maker is aware that this patient-initiated Telehealth encounter is a billable service, with coverage as determined by her insurance carrier. She  is aware that she may receive a bill and has provided verbal consent to proceed: Yes    I was at home while conducting this encounter. The patient was checked in/\"roomed\" by Jalene Paget, LPN via telephone in the office. The patient was at home during the encounter. This visit was completed virtually using Doxy. me  Assessment and Plan:     Encounter Diagnoses     ICD-10-CM ICD-9-CM   1. Attention deficit hyperactivity disorder (ADHD), predominantly inattentive type F90.0 314.00       1. Attention deficit hyperactivity disorder (ADHD), predominantly inattentive type  Over 50% of the 25 minutes face to face with Deshawn Johnson consisted of counseling and/or discussing treatment plans in reference to her ADHD and stimulant medication prescription. I again informed the patient of the testing that I require prior to prescribing stimulant medication for ADHD. She does not have adult ADHD psychological testing though she has since schedule an appointment which is currently set for August. The patient asked for an exception to my prescribing rules as she failed atomoxetine and refuses to try any other non-stimulant medication. I informed her that I would not be prescribing medication without testing. This note will not be viewable in [address] We discussed the expected course, resolution and complications of the diagnosis(es) in detail. Medication risks, benefits, costs, interactions, and alternatives were discussed as indicated.   I advised her to contact the office if her condition worsens, changes or fails to improve as anticipated. She expressed understanding with the diagnosis(es) and plan. CPT Codes 57937-89660 for Established Patients may apply to this Telehealth Visit    Pursuant to the emergency declaration under the [facility] waiver authority and the [employer] Act, this Virtual  Visit was conducted, with patient's consent, to reduce the patient's risk of exposure to COVID-19 and provide continuity of care for an established patient. Services were provided through a video synchronous discussion virtually to substitute for in-person clinic visit. Electronically Signed: Olga Burkett MD    Current Medications after this visit     Current Outpatient Medications   Medication Sig    LORazepam (ATIVAN) 1 mg tablet Take 1 Tab by mouth two (2) times daily as needed for Anxiety for up to 29 days. Max Daily Amount: 2 mg.  cyclobenzaprine (FLEXERIL) 10 mg tablet Take 1 Tab by mouth two (2) times daily as needed for Muscle Spasm(s).  cetirizine (ZYRTEC) 10 mg tablet Take 10 mg by mouth daily.  topiramate (TOPAMAX) 50 mg tablet Take 50 mg by mouth two (2) times a day.  [START ON 6/7/2020] LORazepam (ATIVAN) 1 mg tablet Take 1 Tab by mouth two (2) times daily as needed for Anxiety for up to 29 days. Max Daily Amount: 2 mg.  [START ON 7/7/2020] LORazepam (ATIVAN) 1 mg tablet Take 1 Tab by mouth two (2) times daily as needed for Anxiety for up to 29 days. Max Daily Amount: 2 mg.  dextroamphetamine-amphetamine (ADDERALL) 20 mg tablet      No current facility-administered medications for this visit. There are no discontinued medications.   ~~~~~~~~~~~~~~~~~~~~~~~~~~~~~~~~~~~~~~~~~~~~~~    Chief Complaint   Patient presents with    Medication Evaluation     Would like to discuss Adderall        History of Present Illness   Edin Roberts is a 30 y.o. female who presents for:    ADHD/Adderall  Patient present with cc of discuss why Adderall refill was refused. Patient states that she is going to fail all her classes. She states that she had tried the non-stimulant ADHD after her prior PCP could no longer prescribe controlled substances but states that she did not feel well on the medicaion. She is uncertain of the name and refuses to try any non-stimulant alternatives. Patient states that she did manage to schedule an appointment for psychological testing. Called to [facility] confirmed that patient had filled Atomoxetine 40 mg  filled 3/26/2020 by Dr. Bernie Martini. Review of Systems   Review of Systems   Psychiatric/Behavioral:        +inattention. Vitals/Objective:     Due to this being a TeleHealth evaluation, many elements of the physical examination are unable to be assessed. Physical Exam  Constitutional:       General: She is not in acute distress. Appearance: Normal appearance. She is normal weight. She is not ill-appearing, toxic-appearing or diaphoretic. HENT:      Head: Normocephalic and atraumatic. Right Ear: External ear normal.      Left Ear: External ear normal.      Mouth/Throat:      Mouth: Mucous membranes are moist.   Eyes:      General:         Right eye: No discharge. Left eye: No discharge. Extraocular Movements: Extraocular movements intact. Conjunctiva/sclera: Conjunctivae normal.   Neck:      Musculoskeletal: Normal range of motion. Pulmonary:      Effort: Pulmonary effort is normal.      Comments: No visualized signs of difficulty breathing or respiratory distress  Skin:     Coloration: Skin is not pale. Findings: No erythema or rash. Neurological:      Mental Status: She is alert and oriented to person, place, and time. Cranial Nerves: No cranial nerve deficit ( No Facial Asymmetry (Cranial nerve 7 motor function) (limited exam due to video visit) ).       Comments: Able to follow commands Psychiatric:         Attention and Perception: Attention normal.         Mood and Affect: Mood normal.         Behavior: Behavior normal.          No results found for this or any previous visit (from the past 24 hour(s)). Disposition     No future appointments. History   Patient's past medical, surgical and family histories were reviewed and updated.     Past Medical History:   Diagnosis Date    ADHD     Anxiety     Depression     Eczema     Ran out of her Rx    Molluscum contagiosum 3/2015    on biopsy, inner left thigh    Scoliosis     Screening for malignant neoplasm of the cervix 2013    Negative per pt. @ [address] [place] [address]     Past Surgical History:   Procedure Laterality Date    HX BREAST REDUCTION Bilateral 9/13/2017    BILATERAL BREAST REDUCTION POSSIBLE FREE NIPPLE GRAFT performed by Yaquelin Gonzalez MD at [facility]     Family History   Problem Relation Age of Onset    Diabetes Paternal Uncle     Psychotic Disorder Father     Hypertension Maternal Grandmother     Heart Disease Maternal Grandmother     Diabetes Maternal Grandfather      Social History     Tobacco Use    Smoking status: Never Smoker    Smokeless tobacco: Never Used   Substance Use Topics    Alcohol use: Not Currently     Comment: socially    Drug use: No       Allergies     Allergies   Allergen Reactions    Allegra [Fexofenadine] Itching and Vertigo

## 2020-05-15 NOTE — PROGRESS NOTES
Edin Roberts is a 30 y.o. female      Chief Complaint   Patient presents with    Medication Evaluation     Would like to discuss Adderall          1. Have you been to the ER, urgent care clinic since your last visit? Hospitalized since your last visit? no      2. Have you seen or consulted any other health care providers outside of the [address] [place] [address] since your last visit? Include any pap smears or colon screening.   no

## 2020-05-18 ENCOUNTER — TELEPHONE (OUTPATIENT)
Dept: FAMILY MEDICINE CLINIC | Age: 25
End: 2020-05-18

## 2020-05-18 NOTE — TELEPHONE ENCOUNTER
Pt called and stated she got ADHD testing done at [facility] [facility]. She stated they have since closed down and she has been unable to locate her records. She believes she was seen by a '[address]   I told pt I would look into it but after extensive searching I have not been able to locate if they have moved elsewhere and have confirmed that location is shut down.

## 2020-05-19 NOTE — TELEPHONE ENCOUNTER
Was able to track down NP Ghazal Opal to [address], phone# [phone], left message to call back to see if she has records on pt

## 2020-05-22 ENCOUNTER — TELEPHONE (OUTPATIENT)
Dept: FAMILY MEDICINE CLINIC | Age: 25
End: 2020-05-22

## 2020-05-22 ENCOUNTER — VIRTUAL VISIT (OUTPATIENT)
Dept: FAMILY MEDICINE CLINIC | Age: 25
End: 2020-05-22

## 2020-05-22 DIAGNOSIS — F90.0 ATTENTION DEFICIT HYPERACTIVITY DISORDER (ADHD), PREDOMINANTLY INATTENTIVE TYPE: Primary | ICD-10-CM

## 2020-05-22 NOTE — PROGRESS NOTES
Kiersten Kelly  30 y.o. female  1995  [id number]    [facility] MEDICINE  Progress Note     Encounter Date: 5/22/2020    Kiersten Kelly is a 30 y.o. female who was seen by synchronous (real-time) audio-video technology on 5/22/2020. She and/or her healthcare decision maker is aware that this patient-initiated Telehealth encounter is a billable service, with coverage as determined by her insurance carrier. She  is aware that she may receive a bill and has provided verbal consent to proceed: Yes    I was at home while conducting this encounter. The patient was checked in/\"roomed\" by Ivania Kennedy CMA via telephone in the office. The patient was at home during the encounter. This visit was completed virtually using Doxy. me  Assessment and Plan:     Encounter Diagnoses     ICD-10-CM ICD-9-CM   1. Attention deficit hyperactivity disorder (ADHD), predominantly inattentive type F90.0 314.00       1. Attention deficit hyperactivity disorder (ADHD), predominantly inattentive type  Discussed with patient and given her the testing centers for ADHD. We discussed the expected course, resolution and complications of the diagnosis(es) in detail. Medication risks, benefits, costs, interactions, and alternatives were discussed as indicated. I advised her to contact the office if her condition worsens, changes or fails to improve as anticipated. She expressed understanding with the diagnosis(es) and plan. CPT Codes 09418-38241 for Established Patients may apply to this Telehealth Visit    Pursuant to the emergency declaration under the [facility], 1135 waiver authority and the [employer] Act, this Virtual  Visit was conducted, with patient's consent, to reduce the patient's risk of exposure to COVID-19 and provide continuity of care for an established patient.      Services were provided through a video synchronous discussion virtually to substitute for in-person clinic visit. Electronically Signed: Nakia Randhawa MD    Current Medications after this visit     Current Outpatient Medications   Medication Sig    [START ON 7/7/2020] LORazepam (ATIVAN) 1 mg tablet Take 1 Tab by mouth two (2) times daily as needed for Anxiety for up to 29 days. Max Daily Amount: 2 mg.  cyclobenzaprine (FLEXERIL) 10 mg tablet Take 1 Tab by mouth two (2) times daily as needed for Muscle Spasm(s).  dextroamphetamine-amphetamine (ADDERALL) 20 mg tablet     cetirizine (ZYRTEC) 10 mg tablet Take 10 mg by mouth daily.  topiramate (TOPAMAX) 50 mg tablet Take 50 mg by mouth two (2) times a day. No current facility-administered medications for this visit. Medications Discontinued During This Encounter   Medication Reason    LORazepam (ATIVAN) 1 mg tablet Duplicate Order    LORazepam (ATIVAN) 1 mg tablet Duplicate Order     ~~~~~~~~~~~~~~~~~~~~~~~~~~~~~~~~~~~~~~~~~~~~~~    Chief Complaint   Patient presents with    Medication Refill     Adderall       History of Present Illness   Guy Fallon is a 30 y.o. female who presents for:    ADHD  Patient present with cc of ADHD, requesting Adderall. Patient reports that she had [facility] was 20 year [facility]. However that center has closed and she states that she is unable to get records. Review of Systems   ROS     Vitals/Objective:     Due to this being a TeleHealth evaluation, many elements of the physical examination are unable to be assessed. Physical Exam  Constitutional:       General: She is not in acute distress. Appearance: Normal appearance. She is not ill-appearing. HENT:      Head: Normocephalic and atraumatic. Right Ear: External ear normal.      Left Ear: External ear normal.      Mouth/Throat:      Mouth: Mucous membranes are moist.   Eyes:      General:         Right eye: No discharge.          Left eye: No discharge. Extraocular Movements: Extraocular movements intact. Conjunctiva/sclera: Conjunctivae normal.   Neck:      Musculoskeletal: Normal range of motion. Pulmonary:      Effort: Pulmonary effort is normal.      Comments: No visualized signs of difficulty breathing or respiratory distress  Skin:     Coloration: Skin is not pale. Findings: No erythema or rash. Neurological:      Mental Status: She is alert and oriented to person, place, and time. Cranial Nerves: No cranial nerve deficit ( No Facial Asymmetry (Cranial nerve 7 motor function) (limited exam due to video visit) ). Comments: Able to follow commands    Psychiatric:         Mood and Affect: Mood normal.         Behavior: Behavior normal.          No results found for this or any previous visit (from the past 24 hour(s)). Disposition     No future appointments. History   Patient's past medical, surgical and family histories were reviewed and updated.     Past Medical History:   Diagnosis Date    ADHD     Anxiety     Depression     Eczema     Ran out of her Rx    Molluscum contagiosum 3/2015    on biopsy, inner left thigh    Scoliosis     Screening for malignant neoplasm of the cervix 2013    Negative per pt. @ [address] [place] [address]     Past Surgical History:   Procedure Laterality Date    HX BREAST REDUCTION Bilateral 9/13/2017    BILATERAL BREAST REDUCTION POSSIBLE FREE NIPPLE GRAFT performed by Karena Manrique MD at [facility]     Family History   Problem Relation Age of Onset    Diabetes Paternal Uncle     Psychotic Disorder Father     Hypertension Maternal Grandmother     Heart Disease Maternal Grandmother     Diabetes Maternal Grandfather      Social History     Tobacco Use    Smoking status: Never Smoker    Smokeless tobacco: Never Used   Substance Use Topics    Alcohol use: Not Currently     Comment: socially    Drug use: No       Allergies     Allergies   Allergen Reactions    Allegra [Fexofenadine] Itching and Vertigo

## 2020-05-22 NOTE — PROGRESS NOTES
Patient stated name &     Chief Complaint   Patient presents with    Medication Refill     Adderall        Health Maintenance Due   Topic    HPV Age 9Y-26Y (1 - 2-dose series)    DTaP/Tdap/Td series (1 - Tdap)    PAP AKA CERVICAL CYTOLOGY        Wt Readings from Last 3 Encounters:   05/15/20 150 lb (68 kg)   20 150 lb (68 kg)   19 150 lb (68 kg)     Temp Readings from Last 3 Encounters:   20 98.2 °F (36.8 °C)   19 97.9 °F (36.6 °C)   19 98.1 °F (36.7 °C)     BP Readings from Last 3 Encounters:   20 100/57   19 90/66   19 112/69     Pulse Readings from Last 3 Encounters:   20 80   19 73   19 86         Learning Assessment:  :     No flowsheet data found. Depression Screening:  :     No flowsheet data found. Fall Risk Assessment:  :     No flowsheet data found. Abuse Screening:  :     No flowsheet data found. Coordination of Care Questionnaire:  :     1) Have you been to an emergency room, urgent care clinic since your last visit? No    Hospitalized since your last visit? No             2) Have you seen or consulted any other health care providers outside of [address] [place] [address] since your last visit? No  (Include any pap smears or colon screenings in this section.)    Patient is accompanied by VV I have received verbal consent from Almas Cervantes to discuss any/all medical information while they are present in the room.

## 2020-06-24 ENCOUNTER — TELEPHONE (OUTPATIENT)
Dept: FAMILY MEDICINE CLINIC | Age: 25
End: 2020-06-24

## 2020-06-24 NOTE — LETTER
6/25/2020 8:05 AM 
 
Ms. Raulito Chaidez [phone] To Raulito Chaidez Please see the enclosed list of mental health providers.   
 
 
Sincerely, 
 
 
Farzana Spring MD

## 2020-06-24 NOTE — TELEPHONE ENCOUNTER
Pt is requesting a referral to another psychiatric doctor because she's unable to contact her current psychiatric

## 2020-08-05 NOTE — TELEPHONE ENCOUNTER
Pt is very rude and demanding to have her RX for ADDERALL. Tried to inform pt that fax is currently down and to give her an alternate number so that we can receive to proper information so that she can possible receive her RX. Pt was stating that if an another provider has already approved her Rx why cant the doctor just fill the RX.

## 2020-08-10 ENCOUNTER — VIRTUAL VISIT (OUTPATIENT)
Dept: FAMILY MEDICINE CLINIC | Age: 25
End: 2020-08-10
Payer: MEDICAID

## 2020-08-10 DIAGNOSIS — Z20.822 SUSPECTED COVID-19 VIRUS INFECTION: ICD-10-CM

## 2020-08-10 DIAGNOSIS — F90.0 ATTENTION DEFICIT HYPERACTIVITY DISORDER (ADHD), PREDOMINANTLY INATTENTIVE TYPE: Primary | ICD-10-CM

## 2020-08-10 DIAGNOSIS — L21.9 SEBORRHEIC DERMATITIS OF SCALP: ICD-10-CM

## 2020-08-10 PROCEDURE — 99214 OFFICE O/P EST MOD 30 MIN: CPT | Performed by: FAMILY MEDICINE

## 2020-08-10 RX ORDER — DEXTROAMPHETAMINE SACCHARATE, AMPHETAMINE ASPARTATE, DEXTROAMPHETAMINE SULFATE AND AMPHETAMINE SULFATE 5; 5; 5; 5 MG/1; MG/1; MG/1; MG/1
20 TABLET ORAL 2 TIMES DAILY
Qty: 60 TAB | Refills: 0 | Status: SHIPPED | OUTPATIENT
Start: 2020-08-10 | End: 2020-10-19 | Stop reason: SDUPTHER

## 2020-08-10 RX ORDER — CLOBETASOL PROPIONATE 0.5 MG/ML
LOTION TOPICAL 2 TIMES DAILY
Qty: 59 ML | Refills: 0 | Status: SHIPPED | OUTPATIENT
Start: 2020-08-10

## 2020-08-10 NOTE — LETTER
8/10/2020 8:45 AM 
 
Ms. Theresa Chang [address] [place] [phone] To Whom It May Concern,  
 
Theresa Chang is a patient under my care at [address] She began to experience potential COVID symptoms and is under 2 week quarantine. Please allow her to continue on-line learning through August 24, 2020.   
 
 
 
Sincerely, 
 
 
Brandon Dubois MD

## 2020-08-10 NOTE — PROGRESS NOTES
Samy Kee  30 y.o. female  1995  [id number]    [facility] MEDICINE  Progress Note     Encounter Date: 8/10/2020    Samy Kee is a 30 y.o. female who was seen by synchronous (real-time) audio-video technology on 8/10/2020. She and/or her healthcare decision maker is aware that this patient-initiated Telehealth encounter is a billable service, with coverage as determined by her insurance carrier. She  is aware that she may receive a bill and has provided verbal consent to proceed:Yes    I was at home while conducting this encounter. The patient was checked in/\"roomed\" by Damien Martel LPN via telephone in the office. The patient was at home during the encounter. This visit was completed virtually using Doxy. me  Assessment and Plan:     Encounter Diagnoses     ICD-10-CM ICD-9-CM   1. Attention deficit hyperactivity disorder (ADHD), predominantly inattentive type  F90.0 314.00   2. Suspected COVID-19 virus infection  Z20.828 V01.79   3. Seborrheic dermatitis of scalp  L21.9 690.18       1. Attention deficit hyperactivity disorder (ADHD), predominantly inattentive type  Patient showed report via video and will drop a copy off at the office. Patient's PDMP reviewed and appropriate. Once Dr. Lety Jeronimo reviewed additional 2 months of medication to be faxed to the office.   - dextroamphetamine-amphetamine (ADDERALL) 20 mg tablet; Take 1 Tab by mouth two (2) times a day for 29 days. Max Daily Amount: 40 mg. Dispense: 60 Tab; Refill: 0    2. Suspected COVID-19 virus infection  Letter written for school to be mailed. 3. Seborrheic dermatitis of scalp  - clobetasoL (CLOBEX) 0.05 % lotion; Apply  to affected area two (2) times a day. Please provide with scalp applicator. Dispense: 59 mL; Refill: 0      We discussed the expected course, resolution and complications of the diagnosis(es) in detail.   Medication risks, benefits, costs, interactions, and alternatives were discussed as indicated. I advised her to contact the office if her condition worsens, changes or fails to improve as anticipated. She expressed understanding with the diagnosis(es) and plan. CPT Codes 42178-77374 for Established Patients may apply to this Telehealth Visit    Pursuant to the emergency declaration under the [facility], [facility] authority and the [employer] Act, this Virtual  Visit was conducted, with patient's consent, to reduce the patient's risk of exposure to COVID-19 and provide continuity of care for an established patient. Services were provided through a video synchronous discussion virtually to substitute for in-person clinic visit. Electronically Signed: Roberto Barkley MD    Current Medications after this visit     Current Outpatient Medications   Medication Sig    dextroamphetamine-amphetamine (ADDERALL) 20 mg tablet Take 1 Tab by mouth two (2) times a day for 29 days. Max Daily Amount: 40 mg.    clobetasoL (CLOBEX) 0.05 % lotion Apply  to affected area two (2) times a day. Please provide with scalp applicator.  cetirizine (ZYRTEC) 10 mg tablet Take 10 mg by mouth daily.  cyclobenzaprine (FLEXERIL) 10 mg tablet Take 1 Tab by mouth two (2) times daily as needed for Muscle Spasm(s).  topiramate (TOPAMAX) 50 mg tablet Take 50 mg by mouth two (2) times a day. No current facility-administered medications for this visit. Medications Discontinued During This Encounter   Medication Reason    dextroamphetamine-amphetamine (ADDERALL) 20 mg tablet Reorder     ~~~~~~~~~~~~~~~~~~~~~~~~~~~~~~~~~~~~~~~~~~~~~~    Chief Complaint   Patient presents with    Medication Refill     Addreall       History of Present Illness   Trista Johnson is a 30 y.o. female who presents for:    ADHD  Patient present with cc of ADHD.      Patient had assessment for ADHD completed [facility] Dr. Saran Vivas. Patient had been    COVID symptoms  Patient reports that she has been having HA, sore throat and fatigue for the past week. She has been trying to get tested but has been declined as she did not have a fever. Patient states that she is returning to school and has asked for a note to continue on-line learning during 2 week quarantine period. Review of Systems   Review of Systems   HENT: Positive for congestion and sore throat. Negative for ear discharge and ear pain. Respiratory: Positive for cough. Negative for sputum production and shortness of breath. Genitourinary: Negative for dysuria, frequency and urgency. Skin: Negative for itching and rash. Neurological: Positive for headaches. Psychiatric/Behavioral: Negative for memory loss. The patient does not have insomnia. Vitals/Objective:     Due to this being a TeleHealth evaluation, many elements of the physical examination are unable to be assessed. Physical Exam  Constitutional:       General: She is not in acute distress. Appearance: Normal appearance. She is not ill-appearing. HENT:      Head: Normocephalic and atraumatic. Right Ear: External ear normal.      Left Ear: External ear normal.      Mouth/Throat:      Mouth: Mucous membranes are moist.   Eyes:      General:         Right eye: No discharge. Left eye: No discharge. Extraocular Movements: Extraocular movements intact. Conjunctiva/sclera: Conjunctivae normal.   Neck:      Musculoskeletal: Normal range of motion. Pulmonary:      Effort: Pulmonary effort is normal.      Comments: No visualized signs of difficulty breathing or respiratory distress  Skin:     Coloration: Skin is not pale. Findings: No erythema or rash. Neurological:      Mental Status: She is alert and oriented to person, place, and time.       Cranial Nerves: No cranial nerve deficit ( No Facial Asymmetry (Cranial nerve 7 motor function) (limited exam due to video visit) ). Comments: Able to follow commands    Psychiatric:         Mood and Affect: Mood normal.         Behavior: Behavior normal.         No results found for this or any previous visit (from the past 24 hour(s)). Disposition     No future appointments. History   Patient's past medical, surgical and family histories were reviewed and updated.     Past Medical History:   Diagnosis Date    ADHD     Anxiety     Depression     Eczema     Ran out of her Rx    Molluscum contagiosum 3/2015    on biopsy, inner left thigh    Scoliosis     Screening for malignant neoplasm of the cervix 2013    Negative [facility] pt. [facility]     Past Surgical History:   Procedure Laterality Date    HX BREAST REDUCTION Bilateral 9/13/2017    BILATERAL BREAST REDUCTION POSSIBLE FREE NIPPLE GRAFT performed by Isamar Barr MD at [facility] MA[facility]     Family History   Problem Relation Age of Onset    Diabetes Paternal Uncle     Psychotic Disorder Father     Hypertension Maternal Grandmother     Heart Disease Maternal Grandmother     Diabetes Maternal Grandfather      Social History     Tobacco Use    Smoking status: Never Smoker    Smokeless tobacco: Never Used   Substance Use Topics    Alcohol use: Not Currently     Comment: socially    Drug use: No       Allergies     Allergies   Allergen Reactions    Allegra [Fexofenadine] Itching and Vertigo

## 2020-08-10 NOTE — PROGRESS NOTES
Andree Cortez is a 30 y.o. female      Chief Complaint   Patient presents with    Medication Refill     Addreall         1. Have you been to the ER, urgent care clinic since your last visit? Hospitalized since your last visit? No      2. Have you seen or consulted any other health care providers outside of the [address] [place] [address] since your last visit? Include any pap smears or colon screening.    No

## 2020-08-25 ENCOUNTER — HOSPITAL ENCOUNTER (EMERGENCY)
Age: 25
Discharge: HOME | End: 2020-08-25
Attending: EMERGENCY MEDICINE | Admitting: EMERGENCY MEDICINE
Payer: MEDICAID

## 2020-08-25 VITALS
SYSTOLIC BLOOD PRESSURE: 96 MMHG | OXYGEN SATURATION: 98 % | TEMPERATURE: 98.6 F | DIASTOLIC BLOOD PRESSURE: 58 MMHG | HEART RATE: 98 BPM | RESPIRATION RATE: 16 BRPM
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
## 2020-08-25 DIAGNOSIS — S40.862A INSECT BITE OF LEFT UPPER ARM, INITIAL ENCOUNTER: Primary | ICD-10-CM

## 2020-08-25 PROCEDURE — 99281 EMR DPT VST MAYX REQ PHY/QHP: CPT

## 2020-08-25 RX ORDER — CEPHALEXIN 500 MG/1
500 CAPSULE ORAL 3 TIMES DAILY
Qty: 21 CAP | Refills: 0 | Status: SHIPPED | OUTPATIENT
Start: 2020-08-25 | End: 2020-09-01

## 2020-08-25 RX ORDER — MUPIROCIN CALCIUM 20 MG/G
CREAM TOPICAL 2 TIMES DAILY
Qty: 15 G | Refills: 0 | Status: SHIPPED | OUTPATIENT
Start: 2020-08-25 | End: 2020-10-19

## 2020-08-25 NOTE — ED PROVIDER NOTES
Aditi Duffy is a 30 y.o. female  who presents by private vehicle to ER with c/o Patient presents with: Insect Bite  Patient presents with complaints of insect bite to left arm. Patient noticed bite yesterday, is unsure what bit her. Patient reports erythema surrounding the bite that has increased. Patient also notes a area of redness to left lower leg that she noticed on Sunday after being at the beach. Patient denies any fever or chills, denies any nausea or vomiting. Patient has been putting on Benadryl cream with minimal improvement. She specifically denies any fevers, chills, nausea, vomiting, chest pain, shortness of breath, headache, rash, diarrhea, abdominal pain, urinary/bowel changes, sweating or weight loss. PCP: Gertrude Cushing, MD   PMHx significant for: Past Medical History:  No date: ADHD  No date: Anxiety  No date: Depression  No date: Eczema      Comment:  Ran out of her Rx  3/2015: Molluscum contagiosum      Comment:  on biopsy, inner left thigh  No date: Scoliosis  2013: Screening for malignant neoplasm of the cervix      Comment:  Negative per pt. @ [address] [place] [address]   PSHx significant for: Past Surgical History:  9/13/2017: HX BREAST REDUCTION; Bilateral      Comment:  BILATERAL BREAST REDUCTION POSSIBLE FREE NIPPLE GRAFT                [facility]  Social Hx: Tobacco use: Social History    Tobacco Use      Smoking status: Never Smoker      Smokeless tobacco: Never Used  ; EtOH use: The patient states she drinks 0 per week.; Illicit Drug use: Allergies:   -- Allegra (Fexofenadine) -- Itching and Vertigo    There are no other complaints, changes or physical findings at this time.               Past Medical History:   Diagnosis Date    ADHD     Anxiety     Depression     Eczema     Ran out of her Rx    Molluscum contagiosum 3/2015    on biopsy, inner left thigh    Scoliosis     Screening for malignant neoplasm of the cervix 2013 Negative per pt. @ [address] [facility]       Past Surgical History:   Procedure Laterality Date    HX BREAST REDUCTION Bilateral 9/13/2017    BILATERAL BREAST REDUCTION POSSIBLE FREE NIPPLE GRAFT performed by Isamar Barr MD at [facility]         Family History:   Problem Relation Age of Onset    Diabetes Paternal Uncle     Psychotic Disorder Father     Hypertension Maternal Grandmother     Heart Disease Maternal Grandmother     Diabetes Maternal Grandfather        Social History     Socioeconomic History    Marital status: SINGLE     Spouse name: Not on file    Number of children: Not on file    Years of education: Not on file    Highest education level: Not on file   Occupational History    Not on file   Social Needs    Financial resource strain: Not on file    Food insecurity     Worry: Not on file     Inability: Not on file    Transportation needs     Medical: Not on file     Non-medical: Not on file   Tobacco Use    Smoking status: Never Smoker    Smokeless tobacco: Never Used   Substance and Sexual Activity    Alcohol use: Not Currently     Comment: socially    Drug use: No    Sexual activity: Yes     Partners: Male     Birth control/protection: Condom   Lifestyle    Physical activity     Days per week: Not on file     Minutes per session: Not on file    Stress: Not on file   Relationships    Social connections     Talks on phone: Not on file     Gets together: Not on file     Attends Spiritism service: Not on file     Active member of club or organization: Not on file     Attends meetings of clubs or organizations: Not on file     Relationship status: Not on file    Intimate partner violence     Fear of current or ex partner: Not on file     Emotionally abused: Not on file     Physically abused: Not on file     Forced sexual activity: Not on file   Other Topics Concern    Not on file   Social History Narrative    Not on file         ALLERGIES: Allegra [fexofenadine]    Review of Systems   Constitutional: Negative for activity change, chills and fever. HENT: Negative for congestion, rhinorrhea and sore throat. Respiratory: Negative for shortness of breath. Cardiovascular: Negative for chest pain and leg swelling. Gastrointestinal: Negative for abdominal pain, diarrhea, nausea and vomiting. Genitourinary: Negative for dysuria, vaginal bleeding and vaginal discharge. Musculoskeletal: Negative for arthralgias and myalgias. Skin: Positive for rash. Neurological: Negative for dizziness. Psychiatric/Behavioral: Negative for confusion. All other systems reviewed and are negative. Vitals:    08/25/20 1831   BP: 96/58   Pulse: 98   Resp: 16   Temp: 98.6 °F (37 °C)   SpO2: 98%            Physical Exam  Constitutional:       Appearance: Normal appearance. She is well-developed. HENT:      Head: Normocephalic and atraumatic. Right Ear: External ear normal.      Left Ear: External ear normal.      Nose: Nose normal.      Mouth/Throat:      Pharynx: No oropharyngeal exudate. Eyes:      General: Lids are normal.         Right eye: No discharge. Left eye: No discharge. Conjunctiva/sclera: Conjunctivae normal.   Neck:      Musculoskeletal: Normal range of motion. Thyroid: No thyromegaly. Trachea: No tracheal deviation. Cardiovascular:      Rate and Rhythm: Normal rate and regular rhythm. Heart sounds: Normal heart sounds. Pulmonary:      Effort: Pulmonary effort is normal.      Breath sounds: Normal breath sounds. Abdominal:      General: Bowel sounds are normal.      Palpations: Abdomen is soft. Musculoskeletal: Normal range of motion. Arms:    Skin:     General: Skin is warm and dry. Neurological:      Mental Status: She is alert and oriented to person, place, and time.    Psychiatric:         Judgment: Judgment normal.          MDM  Number of Diagnoses or Management Options  Insect bite of left upper arm, initial encounter: Diagnosis management comments: Assesment/Plan- 30 y.o. Patient presents with: Insect Bite  differential includes: insect bite, infection. Labs and imaging reviewed with N/A. Recommend PCP follow up. Patient educated on reasons to return to the ED.            Procedures

## 2020-08-25 NOTE — DISCHARGE INSTRUCTIONS
Patient Education        Insect Stings and Bites: Care Instructions  Your Care Instructions  Stings and bites from bees, wasps, ants, and other insects often cause pain, swelling, redness, and itching. In some people, especially children, the redness and swelling may be worse. It may extend several inches beyond the affected area. But in most cases, stings and bites don't cause reactions all over the body. If you have had a reaction to an insect sting or bite, you are at risk for a reaction if you get stung or bitten again. Follow-up care is a key part of your treatment and safety. Be sure to make and go to all appointments, and call your doctor if you are having problems. It's also a good idea to know your test results and keep a list of the medicines you take. How can you care for yourself at home? · Do not scratch or rub the skin where the sting or bite occurred. · Put a cold pack or ice cube on the area. Put a thin cloth between the ice and your skin. For some people, a paste of baking soda mixed with a little water helps relieve pain and decrease the reaction. · Take an over-the-counter antihistamine, such as diphenhydramine (Benadryl) or loratadine (Claritin), to relieve swelling, redness, and itching. Calamine lotion or hydrocortisone cream may also help. Do not give antihistamines to your child unless you have checked with the doctor first.  · Be safe with medicines. If your doctor prescribed medicine for your allergy, take it exactly as prescribed. Call your doctor if you think you are having a problem with your medicine. You will get more details on the specific medicines your doctor prescribes. · Your doctor may prescribe a shot of epinephrine to carry with you in case you have a severe reaction. Learn how and when to give yourself the shot, and keep it with you at all times. Make sure it has not . · Go to the emergency room anytime you have a severe reaction.  Go even if you have given yourself epinephrine and are feeling better. Symptoms can come back. When should you call for help? [id number] anytime you think you may need emergency care. For example, call if:  · You have symptoms of a severe allergic reaction. These may include:  ? Sudden raised, red areas (hives) all over your body. ? Swelling of the throat, mouth, lips, or tongue. ? Trouble breathing. ? Passing out (losing consciousness). Or you may feel very lightheaded or suddenly feel weak, confused, or restless. Call your doctor now or seek immediate medical care if:  · You have symptoms of an allergic reaction not right at the sting or bite, such as:  ? A rash or small area of hives (raised, red areas on the skin). ? Itching. ? Swelling. ? Belly pain, nausea, or vomiting. · You have a lot of swelling around the site (such as your entire arm or leg is swollen). · You have signs of infection, such as:  ? Increased pain, swelling, redness, or warmth around the sting. ? Red streaks leading from the area. ? Pus draining from the sting. ? A fever. Watch closely for changes in your health, and be sure to contact your doctor if:  · You do not get better as expected. Where can you learn more? Go to http://pino-dorinda.info/  Enter P390 in the search box to learn more about \"Insect Stings and Bites: Care Instructions. \"  Current as of: June 26, 2019               Content Version: 12.5  © 5577-4476 Healthwise, Incorporated. Care instructions adapted under license by [employer] (which disclaims liability or warranty for this information). If you have questions about a medical condition or this instruction, always ask your healthcare professional. [place] [address] any warranty or liability for your use of this information.

## 2020-08-29 DIAGNOSIS — F41.9 ANXIETY: ICD-10-CM

## 2020-08-31 RX ORDER — LORAZEPAM 1 MG/1
TABLET ORAL
Qty: 60 TAB | Refills: 0 | Status: SHIPPED | OUTPATIENT
Start: 2020-08-31 | End: 2020-10-05

## 2020-09-29 ENCOUNTER — HOSPITAL ENCOUNTER (EMERGENCY)
Age: 25
Discharge: HOME | End: 2020-09-29
Attending: EMERGENCY MEDICINE | Admitting: EMERGENCY MEDICINE
Payer: MEDICAID

## 2020-09-29 VITALS
HEIGHT: 67 IN | TEMPERATURE: 98.3 F | WEIGHT: 160 LBS | RESPIRATION RATE: 18 BRPM | OXYGEN SATURATION: 100 % | BODY MASS INDEX: 25.11 KG/M2 | HEART RATE: 84 BPM | SYSTOLIC BLOOD PRESSURE: 118 MMHG | DIASTOLIC BLOOD PRESSURE: 78 MMHG

## 2020-09-29 DIAGNOSIS — Z77.29 EXPOSURE TO CARBON MONOXIDE: ICD-10-CM

## 2020-09-29 DIAGNOSIS — R42 LIGHTHEADEDNESS: Primary | ICD-10-CM

## 2020-09-29 LAB
ALBUMIN SERPL-MCNC: 4 G/DL (ref 3.5–5)
ALBUMIN/GLOB SERPL: 1 {RATIO} (ref 1.1–2.2)
ALP SERPL-CCNC: 80 U/L (ref 45–117)
ALT SERPL-CCNC: 19 U/L (ref 12–78)
ANION GAP SERPL CALC-SCNC: 5 MMOL/L (ref 5–15)
AST SERPL-CCNC: 16 U/L (ref 15–37)
BILIRUB SERPL-MCNC: 0.3 MG/DL (ref 0.2–1)
BUN SERPL-MCNC: 8 MG/DL (ref 6–20)
BUN/CREAT SERPL: 13 (ref 12–20)
CALCIUM SERPL-MCNC: 8.8 MG/DL (ref 8.5–10.1)
CHLORIDE SERPL-SCNC: 105 MMOL/L (ref 97–108)
CO2 SERPL-SCNC: 28 MMOL/L (ref 21–32)
COHGB MFR BLD: 1.2 % (ref 1–2)
COMMENT, HOLDF: NORMAL
CREAT SERPL-MCNC: 0.63 MG/DL (ref 0.55–1.02)
ERYTHROCYTE [DISTWIDTH] IN BLOOD BY AUTOMATED COUNT: 13.4 % (ref 11.5–14.5)
GLOBULIN SER CALC-MCNC: 4 G/DL (ref 2–4)
GLUCOSE SERPL-MCNC: 89 MG/DL (ref 65–100)
HCT VFR BLD AUTO: 40.2 % (ref 35–47)
HGB BLD OXIMETRY-MCNC: 14 G/DL (ref 14–17)
HGB BLD-MCNC: 13.2 G/DL (ref 11.5–16)
MCH RBC QN AUTO: 33.2 PG (ref 26–34)
MCHC RBC AUTO-ENTMCNC: 32.8 G/DL (ref 30–36.5)
MCV RBC AUTO: 101 FL (ref 80–99)
METHGB MFR BLD: 0.1 % (ref 0–1.4)
NRBC # BLD: 0 K/UL (ref 0–0.01)
NRBC BLD-RTO: 0 PER 100 WBC
OXYHGB MFR BLD: ABNORMAL % (ref 94–97)
PLATELET # BLD AUTO: 420 K/UL (ref 150–400)
PMV BLD AUTO: 9.5 FL (ref 8.9–12.9)
POTASSIUM SERPL-SCNC: 4.1 MMOL/L (ref 3.5–5.1)
PROT SERPL-MCNC: 8 G/DL (ref 6.4–8.2)
RBC # BLD AUTO: 3.98 M/UL (ref 3.8–5.2)
SAMPLES BEING HELD,HOLD: NORMAL
SAO2 % BLD: 43 % (ref 95–99)
SODIUM SERPL-SCNC: 138 MMOL/L (ref 136–145)
WBC # BLD AUTO: 10.7 K/UL (ref 3.6–11)

## 2020-09-29 PROCEDURE — 74011250636 HC RX REV CODE- 250/636: Performed by: EMERGENCY MEDICINE

## 2020-09-29 PROCEDURE — 80053 COMPREHEN METABOLIC PANEL: CPT

## 2020-09-29 PROCEDURE — 36415 COLL VENOUS BLD VENIPUNCTURE: CPT

## 2020-09-29 PROCEDURE — 99284 EMERGENCY DEPT VISIT MOD MDM: CPT

## 2020-09-29 PROCEDURE — 82375 ASSAY CARBOXYHB QUANT: CPT

## 2020-09-29 PROCEDURE — 85027 COMPLETE CBC AUTOMATED: CPT

## 2020-09-29 PROCEDURE — 74011250637 HC RX REV CODE- 250/637: Performed by: EMERGENCY MEDICINE

## 2020-09-29 RX ORDER — ACETAMINOPHEN 500 MG
1000 TABLET ORAL ONCE
Status: COMPLETED | OUTPATIENT
Start: 2020-09-29 | End: 2020-09-29

## 2020-09-29 RX ADMIN — ACETAMINOPHEN 1000 MG: 500 TABLET ORAL at 18:46

## 2020-09-29 RX ADMIN — SODIUM CHLORIDE 1000 ML: 900 INJECTION, SOLUTION INTRAVENOUS at 19:36

## 2020-09-29 NOTE — ED NOTES
Bedside and Verbal shift change report given to Elif  (oncoming nurse) by Cole Spear  (offgoing nurse). Report included the following information SBAR, ED Summary, MAR and Recent Results.

## 2020-09-29 NOTE — ED TRIAGE NOTES
Pt arrives by ems with the c.c of headache and lightheadedness after being exposed to \"gas leak\" pt reports was using gas stove and forgot to turn the burner off; pt reports was on in house for about one hour, pt reports since has had headache.

## 2020-09-29 NOTE — ED NOTES
Ultrasound IV by ED Staff Procedure Note    Patient meets criteria for US IV insertion. Ultrasound IV verbal education provided to patient. Opportunities for questions given. IV ultrasound technique used for PIV placement:  20gauge  VAZQUEZ [address] location. 1 X Attempt(s). Procedure tolerated well. Primary RN aware of IV placement and added to LDA.                                 [place]

## 2020-09-30 NOTE — ED PROVIDER NOTES
HPI   30 y.o. female with no significant past medical history who presents from home with chief complaint of carbon monoxide poisining. Patient states that earlier this afternoon she accidentally left the oven on. She was taking a shower when mother arrived and noticed she left it on and turned it off. She then left her house and while she was driving developed a headache and became lightheaded. She called EMS given she felt unsafe to drive.       PCP: Radu Serrano MD    Past Medical History:   Diagnosis Date    ADHD     Anxiety     Asthma     Depression     Eczema     Ran out of her Rx    Molluscum contagiosum 3/2015    on biopsy, inner left thigh    Scoliosis     Screening for malignant neoplasm of the cervix 2013    Negative per pt. @ [address] [place] [address]       Past Surgical History:   Procedure Laterality Date    HX BREAST REDUCTION Bilateral 9/13/2017    BILATERAL BREAST REDUCTION POSSIBLE FREE NIPPLE GRAFT performed by Lianne Ernst MD at [facility] [place] MA[facility]         Family History:   Problem Relation Age of Onset    Diabetes Paternal Uncle     Psychotic Disorder Father     Hypertension Maternal Grandmother     Heart Disease Maternal Grandmother     Diabetes Maternal Grandfather        Social History     Socioeconomic History    Marital status: SINGLE     Spouse name: Not on file    Number of children: Not on file    Years of education: Not on file    Highest education level: Not on file   Occupational History    Not on file   Social Needs    Financial resource strain: Not on file    Food insecurity     Worry: Not on file     Inability: Not on file   [employer] needs     Medical: Not on file     Non-medical: Not on file   Tobacco Use    Smoking status: Never Smoker    Smokeless tobacco: Never Used   Substance and Sexual Activity    Alcohol use: Not Currently     Comment: socially    Drug use: No    Sexual activity: Yes     Partners: Male     Birth control/protection: Condom Lifestyle    Physical activity     Days per week: Not on file     Minutes per session: Not on file    Stress: Not on file   Relationships    Social connections     Talks on phone: Not on file     Gets together: Not on file     Attends [employer] service: Not on file     Active member of club or organization: Not on file     Attends meetings of clubs or organizations: Not on file     Relationship status: Not on file    Intimate partner violence     Fear of current or ex partner: Not on file     Emotionally abused: Not on file     Physically abused: Not on file     Forced sexual activity: Not on file   Other Topics Concern    Not on file   Social History Narrative    Not on file         ALLERGIES: Allegra [fexofenadine]    Review of Systems   Constitutional: Negative for chills, fatigue and unexpected weight change. HENT: Negative for congestion and sore throat. Eyes: Negative for visual disturbance. Respiratory: Negative for cough, chest tightness and shortness of breath. Cardiovascular: Negative for chest pain, palpitations and leg swelling. Gastrointestinal: Negative for abdominal pain. Endocrine: Negative for polydipsia and polyuria. Genitourinary: Negative for difficulty urinating, dysuria, hematuria, vaginal bleeding and vaginal discharge. Musculoskeletal: Negative for arthralgias and myalgias. Skin: Negative for pallor. Neurological: Positive for headaches. Negative for dizziness. Psychiatric/Behavioral: Negative for dysphoric mood. Vitals:    09/29/20 1816 09/29/20 2041   BP: 109/64 118/78   Pulse: 85 84   Resp: 16 18   Temp: 98.3 °F (36.8 °C)    SpO2: 100% 100%   Weight: 72.6 kg (160 lb)    Height: 5' 7\" (1.702 m)             Physical Exam  Constitutional:       Appearance: Normal appearance. HENT:      Head: Normocephalic and atraumatic. Neck:      Musculoskeletal: Normal range of motion and neck supple.    Cardiovascular:      Rate and Rhythm: Normal rate and regular rhythm. Heart sounds: No murmur. Pulmonary:      Effort: Pulmonary effort is normal. No respiratory distress. Breath sounds: Normal breath sounds. No wheezing, rhonchi or rales. Abdominal:      General: Abdomen is flat. Palpations: Abdomen is soft. Tenderness: There is no abdominal tenderness. Musculoskeletal: Normal range of motion. General: No swelling. Skin:     General: Skin is warm and dry. Capillary Refill: Capillary refill takes less than 2 seconds. Neurological:      General: No focal deficit present. Mental Status: She is alert and oriented to person, place, and time. Mental status is at baseline. Psychiatric:         Mood and Affect: Mood normal.          MDM  Number of Diagnoses or Management Options  Exposure to carbon monoxide:   Lightheadedness:   Diagnosis management comments: Mrs. Linh Lundberg is a 30 y.o F coming in for headache with suspected carbon monoxide poisoning. Was on non-rebreather on arrival to ED. VBG negative for carbon monoxide poisoning. Was provided with tylenol which resolved headache. Was discharged home. 9:24 PM  Patient's results have been reviewed with them. Patient and/or family have verbally conveyed their understanding and agreement of the patient's signs, symptoms, diagnosis, treatment and prognosis and additionally agree to follow up as recommended or return to the Emergency Room should their condition change prior to follow-up. Discharge instructions have also been provided to the patient with some educational information regarding their diagnosis as well a list of reasons why they would want to return to the ER prior to their follow-up appointment should their condition change.        Amount and/or Complexity of Data Reviewed  Tests in the radiology section of CPT®: ordered and reviewed  Discussion of test results with the performing providers: yes  Discuss the patient with other providers: yes Procedures

## 2020-10-05 DIAGNOSIS — F41.9 ANXIETY: ICD-10-CM

## 2020-10-05 RX ORDER — LORAZEPAM 1 MG/1
TABLET ORAL
Qty: 60 TAB | Refills: 0 | Status: SHIPPED | OUTPATIENT
Start: 2020-10-05 | End: 2021-02-12 | Stop reason: SDUPTHER

## 2020-10-19 ENCOUNTER — VIRTUAL VISIT (OUTPATIENT)
Dept: FAMILY MEDICINE CLINIC | Age: 25
End: 2020-10-19
Payer: MEDICAID

## 2020-10-19 DIAGNOSIS — F90.0 ATTENTION DEFICIT HYPERACTIVITY DISORDER (ADHD), PREDOMINANTLY INATTENTIVE TYPE: Primary | ICD-10-CM

## 2020-10-19 DIAGNOSIS — F41.9 ANXIETY: ICD-10-CM

## 2020-10-19 DIAGNOSIS — R20.2 NUMBNESS AND TINGLING: ICD-10-CM

## 2020-10-19 DIAGNOSIS — Z13.220 SCREENING FOR LIPID DISORDERS: ICD-10-CM

## 2020-10-19 DIAGNOSIS — R20.0 NUMBNESS AND TINGLING: ICD-10-CM

## 2020-10-19 PROCEDURE — 99214 OFFICE O/P EST MOD 30 MIN: CPT | Performed by: FAMILY MEDICINE

## 2020-10-19 RX ORDER — LORAZEPAM 1 MG/1
1 TABLET ORAL
Qty: 60 TAB | Refills: 0 | Status: SHIPPED | OUTPATIENT
Start: 2020-11-05 | End: 2020-12-04

## 2020-10-19 RX ORDER — DEXTROAMPHETAMINE SACCHARATE, AMPHETAMINE ASPARTATE, DEXTROAMPHETAMINE SULFATE AND AMPHETAMINE SULFATE 5; 5; 5; 5 MG/1; MG/1; MG/1; MG/1
20 TABLET ORAL 2 TIMES DAILY
Qty: 60 TAB | Refills: 0 | Status: SHIPPED | OUTPATIENT
Start: 2020-10-19 | End: 2020-11-17

## 2020-10-19 NOTE — PROGRESS NOTES
1. Have you been to the ER, urgent care clinic since your last visit? Hospitalized since your last visit? Yes When: 9/29/2020 Where: [facility] Reason for visit: Headache    2. Have you seen or consulted any other health care providers outside of the [address] [place] [address] since your last visit? Include any pap smears or colon screening. No     Chief Complaint   Patient presents with    Medication Refill     Patient-Reported Vitals 10/19/2020   Patient-Reported Weight 150lb   Patient-Reported Height -   Patient-Reported LMP -        Pharmacy verified. [facility][id number] - [place], [place] - [address] AT [address] [facility].

## 2020-10-19 NOTE — LETTER
10/19/2020 1:45 PM 
 
MsAshley Hyatt [place] [phone] To Edmar Monge Enclosed is the referral to neurology.   
 
 
 
Sincerely, 
 
 
Richi Espinal MD

## 2020-10-19 NOTE — PROGRESS NOTES
Edmar Monge  30 y.o. female  1995  [id number]    [address]  Progress Note     Encounter Date: 10/19/2020    Edmar Monge is a 30 y.o. female who was seen by synchronous (real-time) audio-video technology on 10/19/2020. She and/or her healthcare decision maker is aware that this patient-initiated Telehealth encounter is a billable service, with coverage as determined by her insurance carrier. She  is aware that she may receive a bill and has provided verbal consent to proceed:Yes    I was at home while conducting this encounter. The patient was checked in/\"roomed\" by Katlin Meléndez LPN via telephone in the office. The patient was at home during the encounter. This visit was completed virtually using Doxy. me  Assessment and Plan:     Encounter Diagnoses     ICD-10-CM ICD-9-CM   1. Attention deficit hyperactivity disorder (ADHD), predominantly inattentive type  F90.0 314.00   2. Anxiety  F41.9 300.00   3. Screening for lipid disorders  Z13.220 V77.91   4. Numbness and tingling  R20.0 782.0    R20.2        1. Attention deficit hyperactivity disorder (ADHD), predominantly inattentive type  Continue 1 month of medication. Refills to be completed after the UDS is completed and resulted. - dextroamphetamine-amphetamine (ADDERALL) 20 mg tablet; Take 1 Tab by mouth two (2) times a day for 29 days. Max Daily Amount: 40 mg. Dispense: 60 Tab; Refill: 0  - COMPLIANCE DRUG SCREEN/PRESCRIPTION MONITORING    2. Anxiety  - LORazepam (ATIVAN) 1 mg tablet; Take 1 Tab by mouth every four (4) hours as needed for Anxiety for up to 29 days. Max Daily Amount: 6 mg. Dispense: 60 Tab; Refill: 0  - COMPLIANCE DRUG SCREEN/PRESCRIPTION MONITORING    3. Screening for lipid disorders  - LIPID PANEL; Future    4. Numbness and tingling  Referral to neurology to sent forwarded to the patient. - VITAMIN B12; Future  - VITAMIN D, 25 HYDROXY; Future  - FOLATE;  Future  - METABOLIC PANEL, BASIC; Future  - TSH 3RD GENERATION; Future      We discussed the expected course, resolution and complications of the diagnosis(es) in detail. Medication risks, benefits, costs, interactions, and alternatives were discussed as indicated. I advised her to contact the office if her condition worsens, changes or fails to improve as anticipated. She expressed understanding with the diagnosis(es) and plan. CPT Codes 32329-77965 for Established Patients may apply to this Telehealth Visit    Pursuant to the emergency declaration under the [facility] [place] [address] waiver authority and the [employer] Act, this Virtual  Visit was conducted, with patient's consent, to reduce the patient's risk of exposure to COVID-19 and provide continuity of care for an established patient. Services were provided through a video synchronous discussion virtually to substitute for in-person clinic visit. Electronically Signed: Ga Owens MD    Current Medications after this visit     Current Outpatient Medications   Medication Sig    dextroamphetamine-amphetamine (ADDERALL) 20 mg tablet Take 1 Tab by mouth two (2) times a day for 29 days. Max Daily Amount: 40 mg.    [START ON 11/5/2020] LORazepam (ATIVAN) 1 mg tablet Take 1 Tab by mouth every four (4) hours as needed for Anxiety for up to 29 days. Max Daily Amount: 6 mg.  LORazepam (ATIVAN) 1 mg tablet TAKE 1 TABLET BY MOUTH TWICE DAILY FOR UP TO 29 DAYS AS NEEDED FOR ANXIETY. MAX DAILY AMOUNT: 2 MG    clobetasoL (CLOBEX) 0.05 % lotion Apply  to affected area two (2) times a day. Please provide with scalp applicator.  cyclobenzaprine (FLEXERIL) 10 mg tablet Take 1 Tab by mouth two (2) times daily as needed for Muscle Spasm(s).  cetirizine (ZYRTEC) 10 mg tablet Take 10 mg by mouth daily. No current facility-administered medications for this visit.       Medications Discontinued During This Encounter   Medication Reason    mupirocin calcium (Bactroban) 2 % topical cream Not A Current Medication    topiramate (TOPAMAX) 50 mg tablet Not A Current Medication    dextroamphetamine-amphetamine (ADDERALL) 20 mg tablet Reorder     ~~~~~~~~~~~~~~~~~~~~~~~~~~~~~~~~~~~~~~~~~~~~~~    Chief Complaint   Patient presents with    Medication Refill       History of Present Illness   Alexander Kim is a 30 y.o. female who presents for:    Anxiety  Patient present with cc of anxiety. Reports that she has tried multiple SSRI in the past as well as topamax. States that topamax seemed to work well but she is unsure as she also did not take medication routinely. Does recall on topamax she did have some dulling which was why she only took medication sporadically. Last filled lorazepam 1 mg #60 on 10/5/2020. ADHD/memory  Patient reports that she is doing well with her online classes. She has felt after COVID infection and recent [place] exposure that she was not able to work at her top potential. Rue Minors a history of tingling at the tip of her tongue. She is also concerned that she may have MS     Review of Systems   Review of Systems   Constitutional: Negative for chills and fever. HENT: Negative for congestion, ear discharge and sore throat. Eyes: Negative for double vision, photophobia and discharge. Respiratory: Negative for cough, sputum production, shortness of breath and wheezing. Cardiovascular: Negative for chest pain, palpitations and leg swelling. Gastrointestinal: Negative for diarrhea, nausea and vomiting. Genitourinary: Negative for dysuria and urgency. Skin: Negative. Neurological: Positive for tingling. Negative for dizziness, tremors, speech change, focal weakness, seizures, loss of consciousness, weakness and headaches. Psychiatric/Behavioral: Positive for memory loss. Negative for depression, substance abuse and suicidal ideas. The patient is nervous/anxious. Vitals/Objective:     Due to this being a TeleHealth evaluation, many elements of the physical examination are unable to be assessed. Physical Exam  Constitutional:       General: She is not in acute distress. Appearance: Normal appearance. She is normal weight. She is not ill-appearing, toxic-appearing or diaphoretic. HENT:      Head: Normocephalic and atraumatic. Right Ear: External ear normal.      Left Ear: External ear normal.      Mouth/Throat:      Mouth: Mucous membranes are moist.   Eyes:      General:         Right eye: No discharge. Left eye: No discharge. Extraocular Movements: Extraocular movements intact. Conjunctiva/sclera: Conjunctivae normal.   Neck:      Musculoskeletal: Normal range of motion. Pulmonary:      Effort: Pulmonary effort is normal.      Comments: No visualized signs of difficulty breathing or respiratory distress  Skin:     Coloration: Skin is not pale. Findings: No erythema or rash. Neurological:      Mental Status: She is alert and oriented to person, place, and time. Cranial Nerves: No cranial nerve deficit ( No Facial Asymmetry (Cranial nerve 7 motor function) (limited exam due to video visit) ). Comments: Able to follow commands    Psychiatric:         Mood and Affect: Mood normal.         Behavior: Behavior normal.          No results found for this or any previous visit (from the past 24 hour(s)). Disposition     No future appointments. History   Patient's past medical, surgical and family histories were reviewed and updated.     Past Medical History:   Diagnosis Date    ADHD     Anxiety     Asthma     Depression     Eczema     Ran out of her Rx    Molluscum contagiosum 3/2015    on biopsy, inner left thigh    Scoliosis     Screening for malignant neoplasm of the cervix 2013    Negative per pt. @ [facility]     Past Surgical History:   Procedure Laterality Date    HX BREAST REDUCTION Bilateral 9/13/2017 BILATERAL BREAST REDUCTION POSSIBLE FREE NIPPLE GRAFT performed by Cesar Navarro MD at [facility] MAIN OR     Family History   Problem Relation Age of Onset    Diabetes Paternal Uncle     Psychotic Disorder Father     Hypertension Maternal Grandmother     Heart Disease Maternal Grandmother     Diabetes Maternal Grandfather      Social History     Tobacco Use    Smoking status: Never Smoker    Smokeless tobacco: Never Used   Substance Use Topics    Alcohol use: Not Currently    Drug use: No       Allergies     Allergies   Allergen Reactions    Allegra [Fexofenadine] Itching and Vertigo    Pcn [Penicillins] Swelling

## 2020-10-21 ENCOUNTER — TELEPHONE (OUTPATIENT)
Dept: FAMILY MEDICINE CLINIC | Age: 25
End: 2020-10-21

## 2020-10-21 DIAGNOSIS — F41.9 ANXIETY: Primary | ICD-10-CM

## 2020-10-26 ENCOUNTER — TELEPHONE (OUTPATIENT)
Dept: FAMILY MEDICINE CLINIC | Age: 25
End: 2020-10-26

## 2020-10-26 DIAGNOSIS — F41.9 ANXIETY: Primary | ICD-10-CM

## 2020-10-26 DIAGNOSIS — F90.0 ATTENTION DEFICIT HYPERACTIVITY DISORDER (ADHD), PREDOMINANTLY INATTENTIVE TYPE: ICD-10-CM

## 2020-10-26 RX ORDER — LORAZEPAM 1 MG/1
1 TABLET ORAL
Qty: 60 TAB | Refills: 0 | Status: SHIPPED | OUTPATIENT
Start: 2020-12-04 | End: 2021-01-02

## 2020-10-26 RX ORDER — DEXTROAMPHETAMINE SACCHARATE, AMPHETAMINE ASPARTATE, DEXTROAMPHETAMINE SULFATE AND AMPHETAMINE SULFATE 5; 5; 5; 5 MG/1; MG/1; MG/1; MG/1
20 TABLET ORAL 2 TIMES DAILY
Qty: 60 TAB | Refills: 0 | Status: SHIPPED | OUTPATIENT
Start: 2020-11-17 | End: 2020-12-16

## 2020-10-26 RX ORDER — DEXTROAMPHETAMINE SACCHARATE, AMPHETAMINE ASPARTATE, DEXTROAMPHETAMINE SULFATE AND AMPHETAMINE SULFATE 5; 5; 5; 5 MG/1; MG/1; MG/1; MG/1
20 TABLET ORAL 2 TIMES DAILY
Qty: 60 TAB | Refills: 0 | Status: SHIPPED | OUTPATIENT
Start: 2020-12-16 | End: 2021-01-14

## 2020-10-26 RX ORDER — LORAZEPAM 1 MG/1
1 TABLET ORAL
Qty: 60 TAB | Refills: 0 | Status: SHIPPED | OUTPATIENT
Start: 2021-01-02 | End: 2021-01-31

## 2020-10-28 ENCOUNTER — DOCUMENTATION ONLY (OUTPATIENT)
Dept: FAMILY MEDICINE CLINIC | Age: 25
End: 2020-10-28

## 2020-10-28 NOTE — PROGRESS NOTES
PA for Trokendi XR 59 mg ER Capsules Forms completed and faxed by 10/28/2020 to   [facility]- (f): [phone]    Confirmation: OK

## 2021-02-11 DIAGNOSIS — F41.9 ANXIETY: ICD-10-CM

## 2021-02-11 NOTE — TELEPHONE ENCOUNTER
Requested Prescriptions     Pending Prescriptions Disp Refills    LORazepam (ATIVAN) 1 mg tablet 60 Tab 0     No visits with results within 3 Month(s) from this visit. Latest known visit with results is:   Lab Only on 10/20/2020   Component Date Value Ref Range Status    LIPID PROFILE 10/20/2020        Final    Cholesterol, total 10/20/2020 136  <200 MG/DL Final    Triglyceride 10/20/2020 42  <150 MG/DL Final    Comment: Based on NCEP-ATP III:  Triglycerides <150 mg/dL  is considered normal, 150-199  mg/dL  borderline high,  200-499 mg/dL high and  greater than or equal to 500  mg/dL very high.  HDL Cholesterol 10/20/2020 82  MG/DL Final    Comment: Based on NCEP ATP III, HDL Cholesterol <40 mg/dL is considered low and >60  mg/dL is elevated.  LDL, calculated 10/20/2020 45.6  0 - 100 MG/DL Final    Comment: Based on the NCEP-ATP: LDL-C concentrations are considered  optimal <100 mg/dL,  near optimal/above Normal 100-129 mg/dL Borderline High: 130-159, High: 160-189  mg/dL Very High: Greater than or equal to 190 mg/dL      VLDL, calculated 10/20/2020 8.4  MG/DL Final    CHOL/HDL Ratio 10/20/2020 1.7  0.0 - 5.0   Final    TSH 10/20/2020 1.08  0.36 - 3.74 uIU/mL Final    Comment:   Due to TSH heterogeneity, both structurally and degree of glycosylation,  monoclonal antibodies used in the TSH assay may not accurately quantitate TSH. Therefore, this result should be correlated with clinical findings as well as  with other assessments of thyroid function, e.g., free T4, free T3.       Sodium 10/20/2020 139  136 - 145 mmol/L Final    Potassium 10/20/2020 4.1  3.5 - 5.1 mmol/L Final    Chloride 10/20/2020 104  97 - 108 mmol/L Final    CO2 10/20/2020 26  21 - 32 mmol/L Final    Anion gap 10/20/2020 9  5 - 15 mmol/L Final    Glucose 10/20/2020 72  65 - 100 mg/dL Final    BUN 10/20/2020 8  6 - 20 MG/DL Final    Creatinine 10/20/2020 0.73  0.55 - 1.02 MG/DL Final    BUN/Creatinine ratio 10/20/2020 11* 12 - 20   Final    GFR est AA 10/20/2020 >60  >60 ml/min/1.73m2 Final    GFR est non-AA 10/20/2020 >60  >60 ml/min/1.73m2 Final    Comment: Estimated GFR is calculated using the IDMS-traceable Modification of Diet in  Renal Disease (MDRD) Study equation, reported for both  Americans  (GFRAA) and non- Americans (GFRNA), and normalized to 1.73m2 body  surface area. The physician must decide which value applies to the patient.  Calcium 10/20/2020 9.7  8.5 - 10.1 MG/DL Final    Folate 10/20/2020 23.8* 5.0 - 21.0 ng/mL Final    Vitamin D 25-Hydroxy 10/20/2020 31.7  30 - 100 ng/mL Final    Comment: (NOTE)  Deficiency               <20 ng/mL  Insufficiency          20-30 ng/mL  Sufficient             ng/mL  Possible toxicity       >100 ng/mL    The Method used is Siemens Advia Centaur currently standardized to a   Center of Disease Control and Prevention (CDC) certified reference   [facility]. Samples containing fluorescein dye can produce falsely   elevated values when tested with the ADVIA Centaur Vitamin D Assay. It is recommended that results in the toxic range, >100 ng/mL, be   retested 72 hours post fluorescein exposure.  Vitamin B12 10/20/2020 578  193 - 986 pg/mL Final    Summary 10/20/2020 Note   Final    Comment: (NOTE)  ====================================================================  Compliance Drug Analysis, Ur  ====================================================================  Test                             Result       Flag       Units  Drug Present   Amphetamine                    655                     ng/mg creat    Amphetamine is available as a schedule II prescription drug. Lorazepam                      >1351                   ng/mg creat    Source of lorazepam is a scheduled prescription medication.    Acetaminophen                  PRESENT   Diphenhydramine PRESENT  ====================================================================  Test                      Result    Flag   Units      Ref Range   Creatinine              148              mg/dL      >=20  ====================================================================  Declared Medications:  Medication list was not provided.  ====================================================================  For c                           linical consultation, please call [phone].  ====================================================================  Performed At: [address], [place] [id number]  Tom Martinezer PhD EZ:[id number]       10/19/2020 Last office visit or VV

## 2021-02-12 ENCOUNTER — VIRTUAL VISIT (OUTPATIENT)
Dept: FAMILY MEDICINE CLINIC | Age: 26
End: 2021-02-12
Payer: MEDICAID

## 2021-02-12 DIAGNOSIS — F41.9 ANXIETY: Primary | ICD-10-CM

## 2021-02-12 PROCEDURE — 99213 OFFICE O/P EST LOW 20 MIN: CPT | Performed by: NURSE PRACTITIONER

## 2021-02-12 RX ORDER — LORAZEPAM 1 MG/1
1 TABLET ORAL DAILY
Qty: 10 TAB | Refills: 0 | Status: SHIPPED | OUTPATIENT
Start: 2021-02-12 | End: 2021-02-23 | Stop reason: SDUPTHER

## 2021-02-12 RX ORDER — LORAZEPAM 1 MG/1
TABLET ORAL
Qty: 60 TAB | Refills: 0 | OUTPATIENT
Start: 2021-02-12

## 2021-02-12 NOTE — PROGRESS NOTES
Chaz Ocampo is a 30 y.o. female who was seen by synchronous (real-time) audio-video technology on 2/12/2021 for Anxiety        Assessment & Plan:   Diagnoses and all orders for this visit:    1. Anxiety  -     LORazepam (ATIVAN) 1 mg tablet; Take 1 Tab by mouth daily. Max Daily Amount: 1 mg. Indications: anxious    Pt seen by VV. Pt was not forthcoming in regards to all medication she takes and provider she sees. It appear pt has been asked multiple times by Dr Pati Brown to see psych. It appears this has not been done and there is always a future date given. This is the case today, reports she will see Psych on the 17 of this month. Pt  Has recently been prescribed Prozac. I asked who this provider was. .she stated her PCP. I responded we are your PCP. She stated we are hard to get in with so she uses both. I feel this is poly provider and medication abuse. I consulted with Dr Berry Montes. He agreed, advised to give 10 days of med since she is seeing Psych on the 17th, no further refills by us, must be handled by psych. I tried to call pt back and she would not answer. Asked  to reach out to her, will send my chart message. No other complaints at this time. I spent at least 20 minutes on this visit with this established patient. Subjective:       Prior to Admission medications    Medication Sig Start Date End Date Taking? Authorizing Provider   LORazepam (ATIVAN) 1 mg tablet Take 1 Tab by mouth daily. Max Daily Amount: 1 mg. Indications: anxious 2/12/21  Yes Michael Lewis NP   topiramate ER (Trokendi XR) 50 mg capsule Take 1 Cap by mouth daily. 10/21/20   Deshaun Heller MD   LORazepam (ATIVAN) 1 mg tablet TAKE 1 TABLET BY MOUTH TWICE DAILY FOR UP TO 29 DAYS AS NEEDED FOR ANXIETY. MAX DAILY AMOUNT: 2 MG 10/5/20 2/12/21  Deshaun Heller MD   clobetasoL (CLOBEX) 0.05 % lotion Apply  to affected area two (2) times a day. Please provide with scalp applicator.  8/10/20   Javier Martell MD MINDI   cyclobenzaprine (FLEXERIL) 10 mg tablet Take 1 Tab by mouth two (2) times daily as needed for Muscle Spasm(s). 5/7/20   Beth Heller MD   cetirizine (ZYRTEC) 10 mg tablet Take 10 mg by mouth daily. Other, MD Kahlil     Patient Active Problem List   Diagnosis Code    Mood disorder [facility]) F39    Macromastia N62    Attention deficit hyperactivity disorder (ADHD) F90.9     Patient Active Problem List    Diagnosis Date Noted    Attention deficit hyperactivity disorder (ADHD) 05/07/2020    Macromastia 09/13/2017    Mood disorder ([address] 10/16/2015     Current Outpatient Medications   Medication Sig Dispense Refill    LORazepam (ATIVAN) 1 mg tablet Take 1 Tab by mouth daily. Max Daily Amount: 1 mg. Indications: anxious 10 Tab 0    topiramate ER (Trokendi XR) 50 mg capsule Take 1 Cap by mouth daily. 90 Cap 0    clobetasoL (CLOBEX) 0.05 % lotion Apply  to affected area two (2) times a day. Please provide with scalp applicator. 59 mL 0    cyclobenzaprine (FLEXERIL) 10 mg tablet Take 1 Tab by mouth two (2) times daily as needed for Muscle Spasm(s). 30 Tab 3    cetirizine (ZYRTEC) 10 mg tablet Take 10 mg by mouth daily. Allergies   Allergen Reactions    Allegra [Fexofenadine] Itching and Vertigo    Pcn [Penicillins] Swelling     Past Medical History:   Diagnosis Date    ADHD     Anxiety     Asthma     Depression     Eczema     Ran out of her Rx    Molluscum contagiosum 3/2015    on biopsy, inner left thigh    Scoliosis     Screening for malignant neoplasm of the cervix 2013    Negative per pt. @ [facility] 3   Constitutional: Negative for fever and malaise/fatigue. HENT: Negative for congestion, sinus pain and sore throat. Respiratory: Negative for cough and shortness of breath. Cardiovascular: Negative for chest pain. Gastrointestinal: Negative for diarrhea, nausea and vomiting. Genitourinary: Negative for dysuria. Musculoskeletal: Negative. Neurological: Negative for dizziness and headaches. Endo/Heme/Allergies: Negative for environmental allergies. Psychiatric/Behavioral: Positive for depression. The patient is nervous/anxious. Objective:     Patient-Reported Vitals 10/19/2020   Patient-Reported Weight 150lb   Patient-Reported Height -   Patient-Reported LMP -        [INSTRUCTIONS:  \"[x]\" Indicates a positive item  \"[]\" Indicates a negative item  -- DELETE ALL ITEMS NOT EXAMINED]    Constitutional: [x] Appears well-developed and well-nourished [x] No apparent distress      [] Abnormal -     Mental status: [x] Alert and awake  [x] Oriented to person/place/time [x] Able to follow commands    [] Abnormal -     Eyes:   EOM    [x]  Normal    [] Abnormal -   Sclera  [x]  Normal    [] Abnormal -          Discharge [x]  None visible   [] Abnormal -     HENT: [x] Normocephalic, atraumatic  [] Abnormal -   [x] Mouth/Throat: Mucous membranes are moist    External Ears [x] Normal  [] Abnormal -    Neck: [x] No visualized mass [] Abnormal -     Pulmonary/Chest: [x] Respiratory effort normal   [x] No visualized signs of difficulty breathing or respiratory distress        [] Abnormal -      Musculoskeletal:   [x] Normal gait with no signs of ataxia         [x] Normal range of motion of neck        [] Abnormal -     Neurological:        [x] No Facial Asymmetry (Cranial nerve 7 motor function) (limited exam due to video visit)          [x] No gaze palsy        [] Abnormal -          Skin:        [x] No significant exanthematous lesions or discoloration noted on facial skin         [] Abnormal -            Psychiatric:       [x] Normal Affect [] Abnormal -        [x] No Hallucinations    Other pertinent observable physical exam findings:-        We discussed the expected course, resolution and complications of the diagnosis(es) in detail. Medication risks, benefits, costs, interactions, and alternatives were discussed as indicated.   I advised her to contact the office if her condition worsens, changes or fails to improve as anticipated. She expressed understanding with the diagnosis(es) and plan. Cortney Fong, who was evaluated through a patient-initiated, synchronous (real-time) audio-video encounter, and/or her healthcare decision maker, is aware that it is a billable service, with coverage as determined by her insurance carrier. She provided verbal consent to proceed: Yes, and patient identification was verified. It was conducted pursuant to the emergency declaration under the [address] [place] [address], [address] [place] [address] authority and the [employer] and [employer] General Act. A caregiver was present when appropriate. Ability to conduct physical exam was limited. I was at home. The patient was at home.       Gely Zaldivar NP

## 2021-02-16 ENCOUNTER — VIRTUAL VISIT (OUTPATIENT)
Dept: FAMILY MEDICINE CLINIC | Age: 26
End: 2021-02-16
Payer: MEDICAID

## 2021-02-16 DIAGNOSIS — J02.9 SORE THROAT: Primary | ICD-10-CM

## 2021-02-16 DIAGNOSIS — F41.9 ANXIETY: ICD-10-CM

## 2021-02-16 PROCEDURE — 99214 OFFICE O/P EST MOD 30 MIN: CPT | Performed by: FAMILY MEDICINE

## 2021-02-16 RX ORDER — NYSTATIN 100000 [USP'U]/ML
500000 SUSPENSION ORAL 4 TIMES DAILY
Qty: 100 ML | Refills: 0 | Status: SHIPPED | OUTPATIENT
Start: 2021-02-16 | End: 2021-02-21

## 2021-02-16 RX ORDER — CIPROFLOXACIN 250 MG/1
250 TABLET, FILM COATED ORAL 2 TIMES DAILY
COMMUNITY
Start: 2021-02-15

## 2021-02-16 RX ORDER — FLUOXETINE HYDROCHLORIDE 20 MG/1
20 CAPSULE ORAL DAILY
COMMUNITY
Start: 2021-01-06

## 2021-02-16 RX ORDER — HYDROXYZINE PAMOATE 50 MG/1
50 CAPSULE ORAL
COMMUNITY
Start: 2021-01-06

## 2021-02-16 NOTE — PROGRESS NOTES
Lissy Gil  30 y.o. female  1995  LBR:[id number]    [facility]  Progress Note     Encounter Date: 2/16/2021    Lissy Gil is a 30 y.o. female who was seen by synchronous (real-time) audio-video technology on 2/16/2021. She and/or her healthcare decision maker is aware that this patient-initiated Telehealth encounter is a billable service, with coverage as determined by her insurance carrier. She  is aware that she may receive a bill and has provided verbal consent to proceed:Yes    I was at home while conducting this encounter. The patient was at home during the encounter. This visit was completed virtually using D[employer]. me  Assessment and Plan:     Encounter Diagnoses     ICD-10-CM ICD-9-CM   1. Sore throat  J02.9 462   2. Anxiety  F41.9 300.00       1. Sore throat  - nystatin (MYCOSTATIN) 100,000 unit/mL suspension; Take 5 mL by mouth four (4) times daily for 5 days. swish and swallow. Dispense: 100 mL; Refill: 0    2. Anxiety  Patient advised that I will not fill more xanax at this time. Patient has upcoming appointment with psychiatry; she is concerned that psychiatrist will not fill xanax at first appointment and want to see her several times prior to filling prescription. Advised that patient need to establish with psychiatrist and had them send a note to my office detailing plan. Depending on psychiatrist's note and plan, short term prescription may be filled. We discussed the expected course, resolution and complications of the diagnosis(es) in detail. Medication risks, benefits, costs, interactions, and alternatives were discussed as indicated. I advised her to contact the office if her condition worsens, changes or fails to improve as anticipated. She expressed understanding with the diagnosis(es) and plan.      CPT Codes 89026-02037 for Established Patients may apply to this Telehealth Visit    Pursuant to the emergency declaration under the [facility] Act and the [address] authority and the [facility] and [employer] Act, this Virtual  Visit was conducted, with patient's consent, to reduce the patient's risk of exposure to COVID-19 and provide continuity of care for an established patient. Services were provided through a video synchronous discussion virtually to substitute for in-person clinic visit. Electronically Signed: Stefanie Cleveland MD    Current Medications after this visit     Current Outpatient Medications   Medication Sig    FLUoxetine (PROzac) 20 mg capsule Take 20 mg by mouth daily.  hydrOXYzine pamoate (VISTARIL) 50 mg capsule Take 50 mg by mouth every six (6) hours as needed.  ciprofloxacin HCl (CIPRO) 250 mg tablet Take 250 mg by mouth two (2) times a day.  nystatin (MYCOSTATIN) 100,000 unit/mL suspension Take 5 mL by mouth four (4) times daily for 5 days. swish and swallow.  LORazepam (ATIVAN) 1 mg tablet Take 1 Tab by mouth daily. Max Daily Amount: 1 mg. Indications: anxious    topiramate ER (Trokendi XR) 50 mg capsule Take 1 Cap by mouth daily.  clobetasoL (CLOBEX) 0.05 % lotion Apply  to affected area two (2) times a day. Please provide with scalp applicator.  cyclobenzaprine (FLEXERIL) 10 mg tablet Take 1 Tab by mouth two (2) times daily as needed for Muscle Spasm(s).  cetirizine (ZYRTEC) 10 mg tablet Take 10 mg by mouth daily. No current facility-administered medications for this visit. There are no discontinued medications. ~~~~~~~~~~~~~~~~~~~~~~~~~~~~~~~~~~~~~~~~~~~~~~    Chief Complaint   Patient presents with    Anxiety    Medication Refill       History of Present Illness   Aurea Sandifer is a 30 y.o. female who presents for:    Anxiety  Patient present with cc of anxiety. Patient had been seen by NP on 2/12/2021. During his encounter, NP Ruslan Blevins found that patient had been going to another PCP for psychiatric medication.  THe patient has an appointment with psychiatry on 2/17/2021 and was given enough ativan to cover her until that appointment. She admits to going to Dr. Elizabeth Patton, another PCP, and had been prescribed prozac. This morning she awoke with sore throat. States that she had just finished a prescription antibiotic, cipro, from her other PCP for nasal infection. Denies fever, chills, cough, SOB. Review of Systems   Review of Systems   Constitutional: Negative for chills and fever. HENT: Positive for sore throat. Negative for congestion, ear discharge, ear pain and sinus pain. Respiratory: Negative for sputum production, shortness of breath and wheezing. Gastrointestinal: Negative for abdominal pain, diarrhea, nausea and vomiting. Neurological: Negative for dizziness and headaches. Psychiatric/Behavioral: Negative for depression, hallucinations and substance abuse. The patient is nervous/anxious. The patient does not have insomnia. Vitals/Objective:     Due to this being a TeleHealth evaluation, many elements of the physical examination are unable to be assessed. Physical Exam  Constitutional:       General: She is not in acute distress. Appearance: Normal appearance. She is not ill-appearing, toxic-appearing or diaphoretic. HENT:      Head: Normocephalic and atraumatic. Right Ear: External ear normal.      Left Ear: External ear normal.      Mouth/Throat:      Mouth: Mucous membranes are moist.   Eyes:      General:         Right eye: No discharge. Left eye: No discharge. Extraocular Movements: Extraocular movements intact. Conjunctiva/sclera: Conjunctivae normal.   Neck:      Musculoskeletal: Normal range of motion. Pulmonary:      Effort: Pulmonary effort is normal.      Comments: No visualized signs of difficulty breathing or respiratory distress  Skin:     Coloration: Skin is not pale. Findings: No erythema or rash.    Neurological:      Mental Status: She is alert and oriented to person, place, and time. Cranial Nerves: No cranial nerve deficit ( No Facial Asymmetry (Cranial nerve 7 motor function) (limited exam due to video visit) ). Comments: Able to follow commands    Psychiatric:         Mood and Affect: Mood normal.         Behavior: Behavior normal.         No results found for this or any previous visit (from the past 24 hour(s)). Disposition     No future appointments. History   Patient's past medical, surgical and family histories were reviewed and updated.     Past Medical History:   Diagnosis Date    ADHD     Anxiety     Asthma     Depression     Eczema     Ran out of her Rx    Molluscum contagiosum 3/2015    on biopsy, inner left thigh    Scoliosis     Screening for malignant neoplasm of the cervix 2013    Negative per pt. @ [address] [place] [address]     Past Surgical History:   Procedure Laterality Date    HX BREAST REDUCTION Bilateral 9/13/2017    BILATERAL BREAST REDUCTION POSSIBLE FREE NIPPLE GRAFT performed by Shavonne Nix MD at [facility] MAIN [facility]     Family History   Problem Relation Age of Onset    Diabetes Paternal Uncle     Psychotic Disorder Father     Hypertension Maternal Grandmother     Heart Disease Maternal Grandmother     Diabetes Maternal Grandfather      Social History     Tobacco Use    Smoking status: Never Smoker    Smokeless tobacco: Never Used   Substance Use Topics    Alcohol use: Not Currently    Drug use: No       Allergies     Allergies   Allergen Reactions    Allegra [Fexofenadine] Itching and Vertigo    Pcn [Penicillins] Swelling

## 2021-02-16 NOTE — LETTER
NOTIFICATION RETURN TO WORK / SCHOOL 
 
2/16/2021 9:04 AM 
 
Ms. Almas Cervantes [address] [place] [id number] To Whom It May Concern: 
 
Almas Cervantes is currently under the care of [address] [facility]. She was seen virtually on 02/16/21. She will return to work/school on: 2/17/2021 based on symptoms. If there are questions or concerns please have the patient contact our office.  
 
 
 
Sincerely, 
 
 
Yu Love MD

## 2021-02-20 ENCOUNTER — TELEPHONE (OUTPATIENT)
Dept: FAMILY MEDICINE CLINIC | Age: 26
End: 2021-02-20

## 2021-02-20 NOTE — TELEPHONE ENCOUNTER
----- Message from [place] sent at 2/19/2021 12:44 PM EST -----  Regarding: Dr. Terry Tracy (if not patient): pt       Relationship of caller (if not patient): self       Best contact number(s): [phone]      Name of medication and dosage if known: Atovain 1mg twice a day      Is patient out of this medication (yes/no): no      Pharmacy name: [facility] listed in chart? (yes/no): yes  Pharmacy phone number:[phone]        Details to clarify the request: Patient seen a therapist at [address] at [facility] - therapist name Maria G Lopes and she could not write a Rx for her medication. She can not get in to see a psychiatrist until March and needs Rx refilled until then.        O[place] Creeks

## 2021-02-22 NOTE — TELEPHONE ENCOUNTER
Spoke to patient gave her our fax number to have note faxed over or to upload through [employer]

## 2021-02-22 NOTE — TELEPHONE ENCOUNTER
Patient was advised that I will need to see and review note from provider she saw last week prior to considering any further prescriptions. Please check if note has been faxed over.

## 2021-02-23 ENCOUNTER — VIRTUAL VISIT (OUTPATIENT)
Dept: FAMILY MEDICINE CLINIC | Age: 26
End: 2021-02-23
Payer: MEDICAID

## 2021-02-23 DIAGNOSIS — F41.9 ANXIETY: Primary | ICD-10-CM

## 2021-02-23 DIAGNOSIS — Z79.899 LONG TERM PRESCRIPTION BENZODIAZEPINE USE: ICD-10-CM

## 2021-02-23 PROCEDURE — 99214 OFFICE O/P EST MOD 30 MIN: CPT | Performed by: FAMILY MEDICINE

## 2021-02-23 RX ORDER — LORAZEPAM 1 MG/1
1 TABLET ORAL 2 TIMES DAILY
Qty: 60 TAB | Refills: 0 | Status: SHIPPED | OUTPATIENT
Start: 2021-02-23

## 2021-02-23 NOTE — PROGRESS NOTES
Almas Cervantes  30 y.o. female  1995  FNW:[id number]    [facility] Beck [facility] MEDICINE  Progress Note     Encounter Date: 2/23/2021    Almas Cervantes is a 30 y.o. female who was seen by synchronous (real-time) audio-video technology on 2/23/2021. She and/or her healthcare decision maker is aware that this patient-initiated Telehealth encounter is a billable service, with coverage as determined by her insurance carrier. She  is aware that she may receive a bill and has provided verbal consent to proceed:Yes    I was at home while conducting this encounter. The patient was checked in/\"roomed\" by via telephone in the office. The patient was at home during the encounter. This visit was completed virtually using Doxy. me  Assessment and Plan:     Encounter Diagnoses     ICD-10-CM ICD-9-CM   1. Anxiety  F41.9 300.00   2. Long term prescription benzodiazepine use  Z79.899 V58.69       1. Anxiety  2. Long term prescription benzodiazepine use  Patient was very upset when I offered her a tapering dose on her benzodiazepine prescription. She stated that was unaware I would be leaving the practice and had not had time to establish with a new PCP/psychiatrist. Advised her that I have been advising her to establish with psychiatrist since she established care with me in April of 2020; she was able to establish with a second PCP who has been prescribing her psychiatric medication (fluoxetine) though she did not inform this office until she saw my colleague, MONE Sanders, earlier this month. - Patient has not signed paperwork with counselor's office allowing communication.  - Patient declined tapering dose of benzodiazepine and I advised that I will only be prescribing a single additional month of lorazepam.       This note will not be viewable in Fetch[employer] for the following reason(s). This is a Psychotherapy Note.  [facility] Health Providers Only)    We discussed the expected course, resolution and complications of the diagnosis(es) in detail. Medication risks, benefits, costs, interactions, and alternatives were discussed as indicated. I advised her to contact the office if her condition worsens, changes or fails to improve as anticipated. She expressed understanding with the diagnosis(es) and plan. CPT Codes 05841-21421 for Established Patients may apply to this Telehealth Visit    Pursuant to the emergency declaration under the [address] [place] [address] waiver authority and the [employer] Act, this Virtual  Visit was conducted, with patient's consent, to reduce the patient's risk of exposure to COVID-19 and provide continuity of care for an established patient. Services were provided through a video synchronous discussion virtually to substitute for in-person clinic visit. Electronically Signed: Ayleen Albert MD    Current Medications after this visit     Current Outpatient Medications   Medication Sig    FLUoxetine (PROzac) 20 mg capsule Take 20 mg by mouth daily.  hydrOXYzine pamoate (VISTARIL) 50 mg capsule Take 50 mg by mouth every six (6) hours as needed.  ciprofloxacin HCl (CIPRO) 250 mg tablet Take 250 mg by mouth two (2) times a day.  LORazepam (ATIVAN) 1 mg tablet Take 1 Tab by mouth daily. Max Daily Amount: 1 mg. Indications: anxious    topiramate ER (Trokendi XR) 50 mg capsule Take 1 Cap by mouth daily.  clobetasoL (CLOBEX) 0.05 % lotion Apply  to affected area two (2) times a day. Please provide with scalp applicator.  cyclobenzaprine (FLEXERIL) 10 mg tablet Take 1 Tab by mouth two (2) times daily as needed for Muscle Spasm(s).  cetirizine (ZYRTEC) 10 mg tablet Take 10 mg by mouth daily. No current facility-administered medications for this visit. There are no discontinued medications.   ~~~~~~~~~~~~~~~~~~~~~~~~~~~~~~~~~~~~~~~~~~~~~~    Chief Complaint   Patient presents with   [facility] Anxiety History of Present Illness   Deshawn Johnson is a 30 y.o. female who presents for:    Anxiety   Patient present with cc of anxiety needs refill of benzo prescription. Patient reports she was unable to see a psychiatrist last week but had seen a therapist last week, Jose C Taveras with [facility] [address]. She has not signed forms to allow counselor to communicate with her PCP. Patient says that her appointment with a psychiatrist now set up for March. Review of Systems   Review of Systems   Constitutional: Negative for chills and fever. Respiratory: Negative for cough. Psychiatric/Behavioral: The patient is nervous/anxious. Vitals/Objective:     Due to this being a TeleHealth evaluation, many elements of the physical examination are unable to be assessed. Physical Exam  Constitutional:       General: She is not in acute distress. Appearance: Normal appearance. She is not ill-appearing. HENT:      Head: Normocephalic and atraumatic. Right Ear: External ear normal.      Left Ear: External ear normal.      Mouth/Throat:      Mouth: Mucous membranes are moist.   Eyes:      General:         Right eye: No discharge. Left eye: No discharge. Extraocular Movements: Extraocular movements intact. Conjunctiva/sclera: Conjunctivae normal.   Neck:      Musculoskeletal: Normal range of motion. Pulmonary:      Effort: Pulmonary effort is normal.      Comments: No visualized signs of difficulty breathing or respiratory distress  Skin:     Coloration: Skin is not pale. Findings: No erythema or rash. Neurological:      Mental Status: She is alert and oriented to person, place, and time. Cranial Nerves: No cranial nerve deficit ( No Facial Asymmetry (Cranial nerve 7 motor function) (limited exam due to video visit) ).       Comments: Able to follow commands    Psychiatric:         Mood and Affect: Mood normal.         Behavior: Behavior normal.       No results found for this or any previous visit (from the past 24 hour(s)). Disposition     No future appointments. History   Patient's past medical, surgical and family histories were reviewed and updated.     Past Medical History:   Diagnosis Date    ADHD     Anxiety     Asthma     Depression     Eczema     Ran out of her Rx    Molluscum contagiosum 3/2015    on biopsy, inner left thigh    Scoliosis     Screening for malignant neoplasm of the cervix 2013    Negative per pt. @ [address] [place] [address]     Past Surgical History:   Procedure Laterality Date    HX BREAST REDUCTION Bilateral 9/13/2017    BILATERAL BREAST REDUCTION POSSIBLE FREE NIPPLE GRAFT performed by Jevon Tijerina MD at [facility] MAIN OR     Family History   Problem Relation Age of Onset    Diabetes Paternal Uncle     Psychotic Disorder Father     Hypertension Maternal Grandmother     Heart Disease Maternal Grandmother     Diabetes Maternal Grandfather      Social History     Tobacco Use    Smoking status: Never Smoker    Smokeless tobacco: Never Used   Substance Use Topics    Alcohol use: Not Currently    Drug use: No       Allergies     Allergies   Allergen Reactions    Allegra [Fexofenadine] Itching and Vertigo    Pcn [Penicillins] Swelling

## 2022-02-05 ENCOUNTER — HOSPITAL ENCOUNTER (EMERGENCY)
Age: 27
Discharge: HOME | End: 2022-02-05
Attending: EMERGENCY MEDICINE
Payer: MEDICAID

## 2022-02-05 VITALS
DIASTOLIC BLOOD PRESSURE: 76 MMHG | WEIGHT: 160 LBS | RESPIRATION RATE: 20 BRPM | HEIGHT: 66 IN | HEART RATE: 95 BPM | SYSTOLIC BLOOD PRESSURE: 115 MMHG | TEMPERATURE: 97.5 F | OXYGEN SATURATION: 100 % | BODY MASS INDEX: 25.71 KG/M2

## 2022-02-05 DIAGNOSIS — T50.901A MEDICATION OVERDOSE, ACCIDENTAL OR UNINTENTIONAL, INITIAL ENCOUNTER: Primary | ICD-10-CM

## 2022-02-05 LAB
ALBUMIN SERPL-MCNC: 3.7 G/DL (ref 3.5–5)
ALBUMIN/GLOB SERPL: 1.1 {RATIO} (ref 1.1–2.2)
ALP SERPL-CCNC: 88 U/L (ref 45–117)
ALT SERPL-CCNC: 31 U/L (ref 12–78)
ANION GAP SERPL CALC-SCNC: 3 MMOL/L (ref 5–15)
APAP SERPL-MCNC: <2 UG/ML (ref 10–30)
AST SERPL-CCNC: 44 U/L (ref 15–37)
BASOPHILS # BLD: 0 K/UL (ref 0–0.1)
BASOPHILS NFR BLD: 0 % (ref 0–1)
BILIRUB SERPL-MCNC: 0.4 MG/DL (ref 0.2–1)
BUN SERPL-MCNC: 10 MG/DL (ref 6–20)
BUN/CREAT SERPL: 15 (ref 12–20)
CALCIUM SERPL-MCNC: 8.4 MG/DL (ref 8.5–10.1)
CHLORIDE SERPL-SCNC: 106 MMOL/L (ref 97–108)
CO2 SERPL-SCNC: 26 MMOL/L (ref 21–32)
COMMENT, HOLDF: NORMAL
CREAT SERPL-MCNC: 0.67 MG/DL (ref 0.55–1.02)
DIFFERENTIAL METHOD BLD: ABNORMAL
EOSINOPHIL # BLD: 0.4 K/UL (ref 0–0.4)
EOSINOPHIL NFR BLD: 2 % (ref 0–7)
ERYTHROCYTE [DISTWIDTH] IN BLOOD BY AUTOMATED COUNT: 13.7 % (ref 11.5–14.5)
GLOBULIN SER CALC-MCNC: 3.5 G/DL (ref 2–4)
GLUCOSE SERPL-MCNC: 81 MG/DL (ref 65–100)
HCG UR QL: NEGATIVE
HCT VFR BLD AUTO: 38.4 % (ref 35–47)
HGB BLD-MCNC: 12.6 G/DL (ref 11.5–16)
IMM GRANULOCYTES # BLD AUTO: 0 K/UL (ref 0–0.04)
IMM GRANULOCYTES NFR BLD AUTO: 0 % (ref 0–0.5)
LYMPHOCYTES # BLD: 1.4 K/UL (ref 0.8–3.5)
LYMPHOCYTES NFR BLD: 10 % (ref 12–49)
MAGNESIUM SERPL-MCNC: 2 MG/DL (ref 1.6–2.4)
MCH RBC QN AUTO: 33.2 PG (ref 26–34)
MCHC RBC AUTO-ENTMCNC: 32.8 G/DL (ref 30–36.5)
MCV RBC AUTO: 101.3 FL (ref 80–99)
MONOCYTES # BLD: 0.9 K/UL (ref 0–1)
MONOCYTES NFR BLD: 6 % (ref 5–13)
NEUTS SEG # BLD: 12 K/UL (ref 1.8–8)
NEUTS SEG NFR BLD: 82 % (ref 32–75)
NRBC # BLD: 0 K/UL (ref 0–0.01)
NRBC BLD-RTO: 0 PER 100 WBC
PLATELET # BLD AUTO: 328 K/UL (ref 150–400)
PMV BLD AUTO: 9.6 FL (ref 8.9–12.9)
POTASSIUM SERPL-SCNC: 4.2 MMOL/L (ref 3.5–5.1)
PROT SERPL-MCNC: 7.2 G/DL (ref 6.4–8.2)
RBC # BLD AUTO: 3.79 M/UL (ref 3.8–5.2)
SALICYLATES SERPL-MCNC: <1.7 MG/DL (ref 2.8–20)
SAMPLES BEING HELD,HOLD: NORMAL
SODIUM SERPL-SCNC: 135 MMOL/L (ref 136–145)
WBC # BLD AUTO: 14.8 K/UL (ref 3.6–11)

## 2022-02-05 PROCEDURE — 80053 COMPREHEN METABOLIC PANEL: CPT

## 2022-02-05 PROCEDURE — 80143 DRUG ASSAY ACETAMINOPHEN: CPT

## 2022-02-05 PROCEDURE — 99283 EMERGENCY DEPT VISIT LOW MDM: CPT

## 2022-02-05 PROCEDURE — 81025 URINE PREGNANCY TEST: CPT

## 2022-02-05 PROCEDURE — 74011250636 HC RX REV CODE- 250/636: Performed by: EMERGENCY MEDICINE

## 2022-02-05 PROCEDURE — 36415 COLL VENOUS BLD VENIPUNCTURE: CPT

## 2022-02-05 PROCEDURE — 85025 COMPLETE CBC W/AUTO DIFF WBC: CPT

## 2022-02-05 PROCEDURE — 83735 ASSAY OF MAGNESIUM: CPT

## 2022-02-05 PROCEDURE — 80179 DRUG ASSAY SALICYLATE: CPT

## 2022-02-05 PROCEDURE — 93005 ELECTROCARDIOGRAM TRACING: CPT

## 2022-02-05 PROCEDURE — 74011250637 HC RX REV CODE- 250/637: Performed by: EMERGENCY MEDICINE

## 2022-02-05 RX ORDER — FAMOTIDINE 20 MG/1
20 TABLET, FILM COATED ORAL
Status: COMPLETED | OUTPATIENT
Start: 2022-02-05 | End: 2022-02-05

## 2022-02-05 RX ORDER — ONDANSETRON 2 MG/ML
4 INJECTION INTRAMUSCULAR; INTRAVENOUS ONCE
Status: DISCONTINUED | OUTPATIENT
Start: 2022-02-05 | End: 2022-02-05 | Stop reason: HOSPADM

## 2022-02-05 RX ORDER — ONDANSETRON 4 MG/1
4 TABLET, ORALLY DISINTEGRATING ORAL
Qty: 12 TABLET | Refills: 0 | Status: SHIPPED | OUTPATIENT
Start: 2022-02-05

## 2022-02-05 RX ORDER — FAMOTIDINE 20 MG/1
20 TABLET, FILM COATED ORAL 2 TIMES DAILY
Qty: 20 TABLET | Refills: 0 | Status: SHIPPED | OUTPATIENT
Start: 2022-02-05 | End: 2022-02-15

## 2022-02-05 RX ADMIN — SODIUM CHLORIDE 500 ML: 9 INJECTION, SOLUTION INTRAVENOUS at 10:52

## 2022-02-05 RX ADMIN — FAMOTIDINE 20 MG: 20 TABLET ORAL at 10:52

## 2022-02-05 NOTE — ED PROVIDER NOTES
57-year-old female presents from home via private vehicle after an unintentional overdose. Patient states he has been suffering from shoulder pain for some time. Last night she took a number of medications to try to help her self fall asleep and be free from the pain. She took 3 naproxen, 2 Advil PM, 5 Ex-Lax, and 1 Seroquel all last night before bed. This morning she took 1 800 mg tablet of ibuprofen. She denies any alcohol or other substance ingestions. She denies trying to harm herself or commit suicide. This morning she felt somewhat drowsy and fatigued, lightheaded. She called poison control who advised her to come to the emergency department. She reports having some abdominal pain and nausea but no vomiting. No diarrhea. No chest pain. No other complaints at this time.            Past Medical History:   Diagnosis Date    ADHD     Anxiety     Asthma     Depression     Eczema     Ran out of her Rx    Molluscum contagiosum 3/2015    on biopsy, inner left thigh    Scoliosis     Screening for malignant neoplasm of the cervix 2013    Negative per pt. @ [facility]       Past Surgical History:   Procedure Laterality Date    HX BREAST REDUCTION Bilateral 9/13/2017    BILATERAL BREAST REDUCTION POSSIBLE FREE NIPPLE GRAFT performed by Rafael Yousif MD at [facility]         Family History:   Problem Relation Age of Onset    Diabetes Paternal Uncle     Psychotic Disorder Father     Hypertension Maternal Grandmother     Heart Disease Maternal Grandmother     Diabetes Maternal Grandfather        Social History     Socioeconomic History    Marital status: SINGLE     Spouse name: Not on file    Number of children: Not on file    Years of education: Not on file    Highest education level: Not on file   Occupational History    Not on file   Tobacco Use    Smoking status: Never Smoker    Smokeless tobacco: Never Used   Substance and Sexual Activity    Alcohol use: Not Currently    Drug use: No    Sexual activity: Yes     Partners: Male     Birth control/protection: Condom   Other Topics Concern    Not on file   Social History Narrative    Not on file     Social Determinants of Health     Financial Resource Strain:     Difficulty of Paying Living Expenses: Not on file   Food Insecurity:     Worried About Running Out of Food in the Last Year: Not on file    Olivier of Food in the Last Year: Not on file   Transportation Needs:     Lack of Transportation (Medical): Not on file    Lack of Transportation (Non-Medical): Not on file   Physical Activity:     Days of Exercise per Week: Not on file    Minutes of Exercise per Session: Not on file   Stress:     Feeling of Stress : Not on file   Social Connections:     Frequency of Communication with Friends and Family: Not on file    Frequency of Social Gatherings with Friends and Family: Not on file    Attends Sikh Services: Not on file    Active Member of [address] [place] [address] [employer] or Organizations: Not on file    Attends Club or Organization Meetings: Not on file    Marital Status: Not on file   Intimate Partner Violence:     Fear of Current or Ex-Partner: Not on file    Emotionally Abused: Not on file    Physically Abused: Not on file    Sexually Abused: Not on file   Housing Stability:     Unable to Pay for Housing in the Last Year: Not on file    Number of [place] in the Last Year: Not on file    Unstable Housing in the Last Year: Not on file         ALLERGIES: Allegra [fexofenadine] and Pcn [penicillins]    Review of Systems   Constitutional: Negative for fever. HENT: Negative for facial swelling. Eyes: Negative for visual disturbance. Respiratory: Negative for chest tightness. Cardiovascular: Negative for chest pain. Gastrointestinal: Negative for abdominal pain. Genitourinary: Negative for difficulty urinating and dysuria. Musculoskeletal: Negative for arthralgias. Skin: Negative for rash. Neurological: Negative for headaches. Hematological: Negative for adenopathy. Psychiatric/Behavioral: Negative for suicidal ideas. There were no vitals filed for this visit. Physical Exam  Vitals and nursing note reviewed. Constitutional:       General: She is not in acute distress. Appearance: She is well-developed. HENT:      Head: Normocephalic and atraumatic. Eyes:      General: No scleral icterus. Conjunctiva/sclera: Conjunctivae normal.      Pupils: Pupils are equal, round, and reactive to light. Cardiovascular:      Rate and Rhythm: Normal rate. Heart sounds: No murmur heard. Pulmonary:      Effort: Pulmonary effort is normal. No respiratory distress. Abdominal:      General: There is no distension. Musculoskeletal:         General: Normal range of motion. Cervical back: Normal range of motion and neck supple. Skin:     General: Skin is warm and dry. Findings: No rash. Neurological:      Mental Status: She is alert and oriented to person, place, and time. MDM  Number of Diagnoses or Management Options  Medication overdose, accidental or unintentional, initial encounter  Diagnosis management comments: Assessment: Patient resting comfortably at this time. Vital signs remained stable. Symptoms have improved after Pepcid and Zofran. Blood work was unremarkable. I do not think there is any reason for alarm as a result of her ingestion. Advised unintentional no concern for self-harm or suicidal ideation. Patient stable for discharge home. She has been counseled on appropriate medication use. She can return to the ED with any worsening symptoms. Amount and/or Complexity of Data Reviewed  Clinical lab tests: reviewed  Tests in the medicine section of CPT®: reviewed      ED Course as of 02/05/22 1145   Sat Feb 05, 2022   1040 EKG, 12 LEAD, INITIAL  ED EKG interpretation:  Rhythm: normal sinus rhythm. Rate (approx.): 90.   Axis: normal.  ST segment:  No concerning ST elevations or depressions. This EKG was interpreted by Ban Linton MD,ED Provider.      [JM]      ED Course User Index  [JM] Geno Molina MD       Procedures

## 2022-02-07 LAB
ATRIAL RATE: 90 BPM
CALCULATED P AXIS, ECG09: 68 DEGREES
CALCULATED R AXIS, ECG10: 85 DEGREES
CALCULATED T AXIS, ECG11: 33 DEGREES
DIAGNOSIS, 93000: NORMAL
P-R INTERVAL, ECG05: 150 MS
Q-T INTERVAL, ECG07: 352 MS
QRS DURATION, ECG06: 72 MS
QTC CALCULATION (BEZET), ECG08: 430 MS
VENTRICULAR RATE, ECG03: 90 BPM

## 2022-03-19 PROBLEM — N62 MACROMASTIA: Status: ACTIVE | Noted: 2017-09-13

## 2022-03-20 PROBLEM — F90.9 ATTENTION DEFICIT HYPERACTIVITY DISORDER (ADHD): Status: ACTIVE | Noted: 2020-05-07

## 2022-03-26 NOTE — ED TRIAGE NOTES
Patient arrives with c/o unintentional medication overdose, generalized abdominal pain, and SOB. Patient states that she has had bilateral shoulder pain x 2 months and took the following last night: 3 x Naproxen, 2 x Avdil PM, 5 x \"X-lax\", 1 x seroquel. States took 1 x 800 mg ibuprofen this morning. Reports feeling SOB this morning, and called poison control who advised patient to come to ED for evaluation.
There are no Wet Read(s) to document.

## 2023-05-11 RX ORDER — ONDANSETRON 4 MG/1
TABLET, ORALLY DISINTEGRATING ORAL EVERY 8 HOURS PRN
COMMUNITY
Start: 2022-02-05

## 2023-05-11 RX ORDER — HYDROXYZINE PAMOATE 50 MG/1
CAPSULE ORAL EVERY 6 HOURS PRN
COMMUNITY
Start: 2021-01-06

## 2023-05-11 RX ORDER — CYCLOBENZAPRINE HCL 10 MG
TABLET ORAL 2 TIMES DAILY PRN
COMMUNITY
Start: 2020-05-07

## 2023-05-11 RX ORDER — CLOBETASOL PROPIONATE 0.5 MG/ML
LOTION TOPICAL 2 TIMES DAILY
COMMUNITY
Start: 2020-08-10

## 2023-05-11 RX ORDER — LORAZEPAM 1 MG/1
TABLET ORAL 2 TIMES DAILY
COMMUNITY
Start: 2021-02-23

## 2023-05-11 RX ORDER — CETIRIZINE HYDROCHLORIDE 10 MG/1
10 TABLET ORAL DAILY
COMMUNITY

## 2023-05-11 RX ORDER — FLUOXETINE HYDROCHLORIDE 20 MG/1
20 CAPSULE ORAL DAILY
COMMUNITY
Start: 2021-01-06

## 2023-05-11 RX ORDER — CIPROFLOXACIN 250 MG/1
TABLET, FILM COATED ORAL 2 TIMES DAILY
COMMUNITY
Start: 2021-02-15

## 2023-05-11 RX ORDER — TOPIRAMATE 50 MG/1
CAPSULE, EXTENDED RELEASE ORAL DAILY
COMMUNITY
Start: 2020-10-21

## 2023-07-31 ENCOUNTER — HOSPITAL ENCOUNTER (EMERGENCY)
Facility: HOSPITAL | Age: 28
Discharge: HOME | End: 2023-07-31
Attending: STUDENT IN AN ORGANIZED HEALTH CARE EDUCATION/TRAINING PROGRAM
Payer: COMMERCIAL

## 2023-07-31 ENCOUNTER — APPOINTMENT (OUTPATIENT)
Facility: HOSPITAL | Age: 28
End: 2023-07-31
Payer: COMMERCIAL

## 2023-07-31 VITALS
RESPIRATION RATE: 18 BRPM | TEMPERATURE: 97.7 F | HEIGHT: 66 IN | OXYGEN SATURATION: 96 % | BODY MASS INDEX: 32.14 KG/M2 | SYSTOLIC BLOOD PRESSURE: 115 MMHG | WEIGHT: 200 LBS | HEART RATE: 81 BPM | DIASTOLIC BLOOD PRESSURE: 68 MMHG

## 2023-07-31 DIAGNOSIS — R06.02 SHORTNESS OF BREATH: Primary | ICD-10-CM

## 2023-07-31 PROCEDURE — 99283 EMERGENCY DEPT VISIT LOW MDM: CPT

## 2023-07-31 PROCEDURE — 6370000000 HC RX 637 (ALT 250 FOR IP): Performed by: STUDENT IN AN ORGANIZED HEALTH CARE EDUCATION/TRAINING PROGRAM

## 2023-07-31 PROCEDURE — 71046 X-RAY EXAM CHEST 2 VIEWS: CPT

## 2023-07-31 RX ORDER — KETOCONAZOLE 20 MG/ML
SHAMPOO TOPICAL
COMMUNITY
Start: 2023-07-01

## 2023-07-31 RX ORDER — QUETIAPINE FUMARATE 25 MG/1
25 TABLET, FILM COATED ORAL 2 TIMES DAILY
COMMUNITY
Start: 2023-03-20

## 2023-07-31 RX ORDER — IVERMECTIN 3 MG/1
TABLET ORAL
COMMUNITY
Start: 2023-07-16

## 2023-07-31 RX ORDER — DEXTROMETHORPHAN HYDROBROMIDE AND PROMETHAZINE HYDROCHLORIDE 15; 6.25 MG/5ML; MG/5ML
SYRUP ORAL
COMMUNITY
Start: 2023-07-28

## 2023-07-31 RX ORDER — TRIAMCINOLONE ACETONIDE 1 MG/G
CREAM TOPICAL
COMMUNITY
Start: 2023-07-16

## 2023-07-31 RX ORDER — HYDROCHLOROTHIAZIDE 50 MG/1
50 TABLET ORAL DAILY
COMMUNITY
Start: 2023-07-28

## 2023-07-31 RX ORDER — DEXTROAMPHETAMINE/AMPHETAMINE 15 MG
1 CAPSULE, EXT RELEASE 24 HR ORAL EVERY MORNING
COMMUNITY
Start: 2023-07-19

## 2023-07-31 RX ORDER — CLONAZEPAM 0.5 MG/1
TABLET ORAL
COMMUNITY
Start: 2023-07-19

## 2023-07-31 RX ORDER — EMTRICITABINE AND TENOFOVIR ALAFENAMIDE 200; 25 MG/1; MG/1
TABLET ORAL
COMMUNITY
Start: 2023-06-11

## 2023-07-31 RX ORDER — ALBUTEROL SULFATE 90 UG/1
2 AEROSOL, METERED RESPIRATORY (INHALATION) 4 TIMES DAILY
COMMUNITY
Start: 2022-01-05

## 2023-07-31 RX ORDER — DIPHENHYDRAMINE HCL 25 MG
25 CAPSULE ORAL
Status: COMPLETED | OUTPATIENT
Start: 2023-07-31 | End: 2023-07-31

## 2023-07-31 RX ORDER — MONTELUKAST SODIUM 10 MG/1
10 TABLET ORAL DAILY
COMMUNITY
Start: 2023-07-28

## 2023-07-31 RX ORDER — ACYCLOVIR 400 MG/1
TABLET ORAL
COMMUNITY
Start: 2023-07-28

## 2023-07-31 RX ORDER — LAMOTRIGINE 100 MG/1
TABLET ORAL
COMMUNITY
Start: 2023-07-19

## 2023-07-31 RX ADMIN — DIPHENHYDRAMINE HYDROCHLORIDE 25 MG: 25 CAPSULE ORAL at 03:00

## 2023-07-31 ASSESSMENT — LIFESTYLE VARIABLES
HOW OFTEN DO YOU HAVE A DRINK CONTAINING ALCOHOL: 2-4 TIMES A MONTH
HOW MANY STANDARD DRINKS CONTAINING ALCOHOL DO YOU HAVE ON A TYPICAL DAY: 3 OR 4

## 2023-07-31 NOTE — ED PROVIDER NOTES
EMERGENCY DEPARTMENT HISTORY AND PHYSICAL EXAM      Date: 7/31/2023  Patient Name: Tito Martin    History of Presenting Illness     Chief Complaint   Patient presents with    Shortness of Breath     Pt amb to triage and reports CC of Difficulty breathing since waking this morning. Pt reports she was really drunk Sat night and was \"apparently swimming and I don't know how to swim\". Pt reports productive cough throughout the day. Reports hx of Asthma. HPI: History From: patient, History limited by: none  Tito Martin, 32 y.o. female presents to the ED with cc of shortness of breath and cough. Last night she drank quite a bit of alcohol, then went swimming. She states that since then she has had a cough productive of clear sputum, her throat feels a bit scratchy. And she feels short of breath lying flat. She denies any fevers, no leg pain or leg swelling, reports some slight chest tightness with improvement after coughing. No exogenous estrogen use. There are no other complaints, changes, or physical findings at this time. PCP: Miki Erazo MD    No current facility-administered medications on file prior to encounter. Current Outpatient Medications on File Prior to Encounter   Medication Sig Dispense Refill    albuterol sulfate HFA (PROVENTIL;VENTOLIN;PROAIR) 108 (90 Base) MCG/ACT inhaler Inhale 2 puffs into the lungs 4 times daily      vitamin D (CHOLECALCIFEROL) 21448 UNIT CAPS Take 1 capsule by mouth once a week      QUEtiapine (SEROQUEL) 25 MG tablet Take 1 tablet by mouth 2 times daily      acyclovir (ZOVIRAX) 400 MG tablet TAKE 1 TABLET BY MOUTH 4 TIMES A DAY      ADDERALL XR 15 MG capsule Take 1 capsule by mouth every morning.  Max Daily Amount: 1 capsule      clonazePAM (KLONOPIN) 0.5 MG tablet TAKE 1 TABLET (0.5 MG) BY MOUTH UP TO TWICE A DAY AS NEEDED      DESCOVY 200-25 MG TABS tablet TAKE 2 TABLETS ORALLY ONE TIME WITHIN 24 HOURS      hydroCHLOROthiazide

## 2023-09-02 ENCOUNTER — HOSPITAL ENCOUNTER (EMERGENCY)
Facility: HOSPITAL | Age: 28
Discharge: HOME | End: 2023-09-02
Attending: EMERGENCY MEDICINE

## 2023-09-02 VITALS
OXYGEN SATURATION: 95 % | TEMPERATURE: 98.8 F | WEIGHT: 196.87 LBS | BODY MASS INDEX: 31.78 KG/M2 | DIASTOLIC BLOOD PRESSURE: 92 MMHG | RESPIRATION RATE: 18 BRPM | SYSTOLIC BLOOD PRESSURE: 123 MMHG | HEART RATE: 96 BPM

## 2023-09-02 DIAGNOSIS — R21 PAPULOVESICULAR RASH: Primary | ICD-10-CM

## 2023-09-02 PROCEDURE — 99283 EMERGENCY DEPT VISIT LOW MDM: CPT

## 2023-09-02 RX ORDER — ACYCLOVIR 400 MG/1
400 TABLET ORAL 4 TIMES DAILY
Qty: 40 TABLET | Refills: 0 | Status: SHIPPED | OUTPATIENT
Start: 2023-09-02 | End: 2023-09-12

## 2023-09-02 RX ORDER — TRIAMCINOLONE ACETONIDE 5 MG/G
OINTMENT TOPICAL 3 TIMES DAILY
Qty: 15 G | Refills: 1 | Status: SHIPPED | OUTPATIENT
Start: 2023-09-02

## 2023-09-02 RX ORDER — HYDROXYZINE HYDROCHLORIDE 25 MG/1
25 TABLET, FILM COATED ORAL EVERY 6 HOURS PRN
Qty: 20 TABLET | Refills: 0 | Status: SHIPPED | OUTPATIENT
Start: 2023-09-02 | End: 2023-09-09

## 2023-09-02 ASSESSMENT — PAIN - FUNCTIONAL ASSESSMENT: PAIN_FUNCTIONAL_ASSESSMENT: 0-10

## 2023-09-02 ASSESSMENT — PAIN SCALES - GENERAL: PAINLEVEL_OUTOF10: 7

## 2023-09-02 NOTE — DISCHARGE INSTRUCTIONS
Try using Triamcinolone 0.5% (extra strength) ointment 2-3 times daily for up to 2 weeks, to affected areas. Use Atarax for itching, and I suspect it will help the sensation in your throat as well.

## 2023-09-02 NOTE — ED NOTES
Went to give pt discharge instructions with printed prescription, pt was not in the room. MD Porsha English made aware. Called pt with number on file. Phone went straight to busy and was not able to make a voicemail. Geetha Haley RN  09/02/23 0057    This RN attempted to phone the patient one more time. The call went immediately to a busy signal again. No attempts were made to call the next-of-kin on file due to patient confidentiality. Oncoming RN made aware and provided with Pt paperwork in case of follow up.       Avis Kowalski  09/02/23 5409

## 2023-09-02 NOTE — ED PROVIDER NOTES
EMERGENCY DEPARTMENT HISTORY AND PHYSICAL EXAM    Date: 9/2/2023  Patient Name: Ana Biggs  Patient Age and Sex: 25 y.o. female  MRN:  [id number]  CSN:  [id number]    History of Present Illness     Chief Complaint   Patient presents with    Rash     Pt ambulatory into triage with c/o rash on her fingers for the last month. Lesions have been on both hands, but today are primarily on her R hand. Pt also reporting some SOB. Has taken Benadryl but reports no relief. History Provided By: Patient    Ability to gather history was limited by:     HPI: Ana Biggs, 25 y.o. female   Reports a few months of itching sensation to the dorsal surface of the right hand especially around the knuckles of the fingers, associated with a small papular and sometimes vesicular rash. She reports that she has seen 2 dermatologists and her primary care physician about this. She had a biopsy performed by the dermatologist.  She has not gotten any relief from any medications. She has not tried topical steroids. Also reporting a tightening sensation in her throat since last night. Tobacco Use      Smoking status: Never      Smokeless tobacco: Never     Past History   The patient's medical, surgical, and social history were reviewed by me today. Current Medications:  No current facility-administered medications on file prior to encounter. Current Outpatient Medications on File Prior to Encounter   Medication Sig Dispense Refill    albuterol sulfate HFA (PROVENTIL;VENTOLIN;PROAIR) 108 (90 Base) MCG/ACT inhaler Inhale 2 puffs into the lungs 4 times daily      ADDERALL XR 15 MG capsule Take 1 capsule by mouth every morning.  Max Daily Amount: 1 capsule      vitamin D (CHOLECALCIFEROL) 36923 UNIT CAPS Take 1 capsule by mouth once a week      clonazePAM (KLONOPIN) 0.5 MG tablet TAKE 1 TABLET (0.5 MG) BY MOUTH UP TO TWICE A DAY AS NEEDED      DESCOVY 200-25 MG TABS tablet TAKE 2 TABLETS ORALLY ONE TIME WITHIN 24 HOURS problems were discussed, and the patient was in agreement with the care plan formulated and outlined with them. Please note that this dictation was completed with Dragon voice recognition software. Quite often unanticipated grammatical, syntax, homophones, and other interpretive errors are inadvertently transcribed by the computer software. Please disregard these errors and excuse any errors that have escaped final proofreading. Nataliya Jean MD    I personally performed the services described in this documentation on this date 9/2/23  for patient Lanny Ng.       Nataliya Jean MD  6:40 AM            Nataliya Jean MD  09/02/23 1981

## 2023-09-22 ENCOUNTER — HOSPITAL ENCOUNTER (EMERGENCY)
Facility: HOSPITAL | Age: 28
Discharge: HOME | End: 2023-09-23
Attending: EMERGENCY MEDICINE
Payer: MEDICAID

## 2023-09-22 DIAGNOSIS — R10.30 LOWER ABDOMINAL PAIN: Primary | ICD-10-CM

## 2023-09-22 PROCEDURE — 99285 EMERGENCY DEPT VISIT HI MDM: CPT

## 2023-09-22 ASSESSMENT — PAIN DESCRIPTION - LOCATION: LOCATION: ABDOMEN

## 2023-09-22 ASSESSMENT — PAIN - FUNCTIONAL ASSESSMENT: PAIN_FUNCTIONAL_ASSESSMENT: 0-10

## 2023-09-22 ASSESSMENT — PAIN SCALES - GENERAL: PAINLEVEL_OUTOF10: 10

## 2023-09-23 ENCOUNTER — APPOINTMENT (OUTPATIENT)
Facility: HOSPITAL | Age: 28
End: 2023-09-23
Payer: MEDICAID

## 2023-09-23 VITALS
BODY MASS INDEX: 31.6 KG/M2 | SYSTOLIC BLOOD PRESSURE: 118 MMHG | OXYGEN SATURATION: 100 % | TEMPERATURE: 98.4 F | DIASTOLIC BLOOD PRESSURE: 81 MMHG | HEART RATE: 84 BPM | HEIGHT: 66 IN | WEIGHT: 196.65 LBS | RESPIRATION RATE: 16 BRPM

## 2023-09-23 LAB
ALBUMIN SERPL-MCNC: 4.2 G/DL (ref 3.5–5)
ALBUMIN/GLOB SERPL: 1 (ref 1.1–2.2)
ALP SERPL-CCNC: 93 U/L (ref 45–117)
ALT SERPL-CCNC: 26 U/L (ref 12–78)
ANION GAP SERPL CALC-SCNC: 6 MMOL/L (ref 5–15)
APPEARANCE UR: CLEAR
AST SERPL-CCNC: 21 U/L (ref 15–37)
BACTERIA URNS QL MICRO: ABNORMAL /HPF
BASOPHILS # BLD: 0.1 K/UL (ref 0–0.1)
BASOPHILS NFR BLD: 1 % (ref 0–1)
BILIRUB SERPL-MCNC: 0.2 MG/DL (ref 0.2–1)
BILIRUB UR QL: NEGATIVE
BUN SERPL-MCNC: 12 MG/DL (ref 6–20)
BUN/CREAT SERPL: 15 (ref 12–20)
CALCIUM SERPL-MCNC: 9 MG/DL (ref 8.5–10.1)
CHLORIDE SERPL-SCNC: 107 MMOL/L (ref 97–108)
CLUE CELLS VAG QL WET PREP: NORMAL
CO2 SERPL-SCNC: 24 MMOL/L (ref 21–32)
COLOR UR: ABNORMAL
CREAT SERPL-MCNC: 0.79 MG/DL (ref 0.55–1.02)
DIFFERENTIAL METHOD BLD: ABNORMAL
EOSINOPHIL # BLD: 0.4 K/UL (ref 0–0.4)
EOSINOPHIL NFR BLD: 3 % (ref 0–7)
EPITH CASTS URNS QL MICRO: ABNORMAL /LPF
ERYTHROCYTE [DISTWIDTH] IN BLOOD BY AUTOMATED COUNT: 14.9 % (ref 11.5–14.5)
GLOBULIN SER CALC-MCNC: 4.4 G/DL (ref 2–4)
GLUCOSE SERPL-MCNC: 80 MG/DL (ref 65–100)
GLUCOSE UR STRIP.AUTO-MCNC: NEGATIVE MG/DL
HCG UR QL: NEGATIVE
HCT VFR BLD AUTO: 42 % (ref 35–47)
HGB BLD-MCNC: 14.2 G/DL (ref 11.5–16)
HGB UR QL STRIP: NEGATIVE
IMM GRANULOCYTES # BLD AUTO: 0.1 K/UL (ref 0–0.04)
IMM GRANULOCYTES NFR BLD AUTO: 0 % (ref 0–0.5)
KETONES UR QL STRIP.AUTO: ABNORMAL MG/DL
KOH PREP SPEC: NORMAL
LEUKOCYTE ESTERASE UR QL STRIP.AUTO: NEGATIVE
LIPASE SERPL-CCNC: 98 U/L (ref 73–393)
LYMPHOCYTES # BLD: 3.6 K/UL (ref 0.8–3.5)
LYMPHOCYTES NFR BLD: 23 % (ref 12–49)
MCH RBC QN AUTO: 32.9 PG (ref 26–34)
MCHC RBC AUTO-ENTMCNC: 33.8 G/DL (ref 30–36.5)
MCV RBC AUTO: 97.2 FL (ref 80–99)
MONOCYTES # BLD: 1.1 K/UL (ref 0–1)
MONOCYTES NFR BLD: 7 % (ref 5–13)
NEUTS SEG # BLD: 10.5 K/UL (ref 1.8–8)
NEUTS SEG NFR BLD: 66 % (ref 32–75)
NITRITE UR QL STRIP.AUTO: NEGATIVE
NRBC # BLD: 0 K/UL (ref 0–0.01)
NRBC BLD-RTO: 0 PER 100 WBC
PH UR STRIP: 6.5 (ref 5–8)
PLATELET # BLD AUTO: 424 K/UL (ref 150–400)
PMV BLD AUTO: 9.4 FL (ref 8.9–12.9)
POTASSIUM SERPL-SCNC: 3.5 MMOL/L (ref 3.5–5.1)
PROT SERPL-MCNC: 8.6 G/DL (ref 6.4–8.2)
PROT UR STRIP-MCNC: NEGATIVE MG/DL
RBC # BLD AUTO: 4.32 M/UL (ref 3.8–5.2)
RBC #/AREA URNS HPF: ABNORMAL /HPF (ref 0–5)
SERVICE CMNT-IMP: NORMAL
SODIUM SERPL-SCNC: 137 MMOL/L (ref 136–145)
SP GR UR REFRACTOMETRY: 1.02
T VAGINALIS VAG QL WET PREP: NORMAL
URINE CULTURE IF INDICATED: ABNORMAL
UROBILINOGEN UR QL STRIP.AUTO: 1 EU/DL (ref 0.2–1)
WBC # BLD AUTO: 15.7 K/UL (ref 3.6–11)
WBC URNS QL MICRO: ABNORMAL /HPF (ref 0–4)

## 2023-09-23 PROCEDURE — 96374 THER/PROPH/DIAG INJ IV PUSH: CPT

## 2023-09-23 PROCEDURE — 87210 SMEAR WET MOUNT SALINE/INK: CPT

## 2023-09-23 PROCEDURE — 81025 URINE PREGNANCY TEST: CPT

## 2023-09-23 PROCEDURE — 6360000002 HC RX W HCPCS: Performed by: EMERGENCY MEDICINE

## 2023-09-23 PROCEDURE — 36415 COLL VENOUS BLD VENIPUNCTURE: CPT

## 2023-09-23 PROCEDURE — 80053 COMPREHEN METABOLIC PANEL: CPT

## 2023-09-23 PROCEDURE — 85025 COMPLETE CBC W/AUTO DIFF WBC: CPT

## 2023-09-23 PROCEDURE — 74177 CT ABD & PELVIS W/CONTRAST: CPT

## 2023-09-23 PROCEDURE — 83690 ASSAY OF LIPASE: CPT

## 2023-09-23 PROCEDURE — 96375 TX/PRO/DX INJ NEW DRUG ADDON: CPT

## 2023-09-23 PROCEDURE — 87591 N.GONORRHOEAE DNA AMP PROB: CPT

## 2023-09-23 PROCEDURE — 6360000004 HC RX CONTRAST MEDICATION: Performed by: EMERGENCY MEDICINE

## 2023-09-23 PROCEDURE — 81001 URINALYSIS AUTO W/SCOPE: CPT

## 2023-09-23 PROCEDURE — 87491 CHLMYD TRACH DNA AMP PROBE: CPT

## 2023-09-23 RX ORDER — ONDANSETRON 2 MG/ML
4 INJECTION INTRAMUSCULAR; INTRAVENOUS ONCE
Status: COMPLETED | OUTPATIENT
Start: 2023-09-23 | End: 2023-09-23

## 2023-09-23 RX ORDER — MORPHINE SULFATE 10 MG/ML
6 INJECTION, SOLUTION INTRAMUSCULAR; INTRAVENOUS
Status: COMPLETED | OUTPATIENT
Start: 2023-09-23 | End: 2023-09-23

## 2023-09-23 RX ORDER — DICYCLOMINE HCL 20 MG
20 TABLET ORAL 4 TIMES DAILY PRN
Qty: 30 TABLET | Refills: 0 | Status: SHIPPED | OUTPATIENT
Start: 2023-09-23 | End: 2023-10-03

## 2023-09-23 RX ORDER — INDOMETHACIN 50 MG/1
50 CAPSULE ORAL 2 TIMES DAILY WITH MEALS
Qty: 20 CAPSULE | Refills: 0 | Status: SHIPPED | OUTPATIENT
Start: 2023-09-23

## 2023-09-23 RX ADMIN — IOPAMIDOL 100 ML: 755 INJECTION, SOLUTION INTRAVENOUS at 01:39

## 2023-09-23 RX ADMIN — ONDANSETRON 4 MG: 2 INJECTION INTRAMUSCULAR; INTRAVENOUS at 01:55

## 2023-09-23 RX ADMIN — MORPHINE SULFATE 6 MG: 10 INJECTION, SOLUTION INTRAMUSCULAR; INTRAVENOUS at 01:55

## 2023-09-23 ASSESSMENT — PAIN SCALES - GENERAL: PAINLEVEL_OUTOF10: 8

## 2023-09-23 NOTE — ED NOTES
Pt requested \"breathing treatment\" due to reported \"weird breathing\". No distress observed, and O2 sat is currently 100%. Education provided regarding morphine side effects. Pt verbalizes understanding, and I encouraged her to call us again if she develops SOB.       Rubia Pinto, [address]  09/23/23 4923

## 2023-09-24 ASSESSMENT — ENCOUNTER SYMPTOMS
ABDOMINAL PAIN: 1
VOMITING: 0

## 2023-09-24 NOTE — ED PROVIDER NOTES
EMERGENCY DEPARTMENT HISTORY AND PHYSICAL EXAM    Date: 9/22/2023  Patient Name: Ana Biggs  Patient Age and Sex: 25 y.o. female  MRN:  [id number]  CSN:  [id number]    History of Present Illness     Chief Complaint   Patient presents with    Abdominal Cramping     Pt arrives ambulatory to triage for lower abdominal cramping that began x1 week ago when pt started on iron supplements. Hx of PCOS. Last period on 9/7. Denies vaginal d/c nor N/V/D. Denies dysuria as well        History Provided By: Patient    Ability to gather history was limited by:     HPI: Ana Biggs, 25 y.o. female   With history of anxiety, ADHD, complains of cramping across the lower abdomen for the past 1 week or longer even. She has a history of PCOS and thinks that the symptoms are consistent with her PCOS pain. She last menstruated about 2 weeks ago. No nausea or vomiting. No fevers. Symptoms are crampy and moderate severity. Tobacco Use      Smoking status: Never      Smokeless tobacco: Never     Past History   The patient's medical, surgical, and social history were reviewed by me today. Current Medications:  No current facility-administered medications on file prior to encounter. Current Outpatient Medications on File Prior to Encounter   Medication Sig Dispense Refill    triamcinolone (ARISTOCORT) 0.5 % ointment Apply topically 3 times daily 15 g 1    albuterol sulfate HFA (PROVENTIL;VENTOLIN;PROAIR) 108 (90 Base) MCG/ACT inhaler Inhale 2 puffs into the lungs 4 times daily      ADDERALL XR 15 MG capsule Take 1 capsule by mouth every morning.  Max Daily Amount: 1 capsule      vitamin D (CHOLECALCIFEROL) 17137 UNIT CAPS Take 1 capsule by mouth once a week      clonazePAM (KLONOPIN) 0.5 MG tablet TAKE 1 TABLET (0.5 MG) BY MOUTH UP TO TWICE A DAY AS NEEDED      DESCOVY 200-25 MG TABS tablet TAKE 2 TABLETS ORALLY ONE TIME WITHIN 24 HOURS      hydroCHLOROthiazide (HYDRODIURIL) 50 MG tablet Take 1 tablet by mouth daily

## 2023-12-23 ENCOUNTER — HOSPITAL ENCOUNTER (EMERGENCY)
Facility: HOSPITAL | Age: 28
Discharge: HOME | End: 2023-12-23
Payer: COMMERCIAL

## 2023-12-23 VITALS
BODY MASS INDEX: 31.18 KG/M2 | HEART RATE: 88 BPM | RESPIRATION RATE: 18 BRPM | HEIGHT: 66 IN | WEIGHT: 194 LBS | TEMPERATURE: 97.9 F | SYSTOLIC BLOOD PRESSURE: 117 MMHG | DIASTOLIC BLOOD PRESSURE: 67 MMHG | OXYGEN SATURATION: 100 %

## 2023-12-23 DIAGNOSIS — Z71.1 FEARED CONDITION NOT DEMONSTRATED: Primary | ICD-10-CM

## 2023-12-23 PROCEDURE — 99282 EMERGENCY DEPT VISIT SF MDM: CPT

## 2023-12-23 NOTE — ED NOTES
Pt discharged by Deanne Osorio RN. Discharge instructions discussed and pt given opportunity to ask questions.  Pt ambulatory out of ED

## 2023-12-23 NOTE — DISCHARGE INSTRUCTIONS
Thank You! It was a pleasure taking care of you in our Emergency Department today. We know that when you come to [facility], you are entrusting us with your health, comfort, and safety. Our clinicians honor that trust, and truly appreciate the opportunity to care for you and your loved ones. We also value your feedback. If you receive a survey about your Emergency Department experience today, please fill it out. We care about our patients' feedback, and we listen to what you have to say. Thank you.     María Ren PA-C

## 2023-12-23 NOTE — ED PROVIDER NOTES
\[facility]\"" EMERGENCY DEPT  EMERGENCY DEPARTMENT ENCOUNTER       Pt Name: Essence Rodriguez  MRN: [id number]  [address] [facility] 1995  Date of evaluation: 12/23/2023  Provider: LENNY Roque   PCP: Janiya Perez MD  Note Started: 6:28 PM EST 12/23/23     CHIEF COMPLAINT       Chief Complaint   Patient presents with    Foreign Body     Pt ambulatory into triage. Pt went to change tampon last night but was unable to locate it. Pt denies any pain or other sx at this time         HISTORY OF PRESENT ILLNESS: 1 or more elements      History From: Patient  None     Essence Rodriguez is a 25 y.o. female who presents to the ED for concern for possible retained tampon. States she went to change her tampon last night and couldn't locate it, unsure if she took it out or not. She is currently on her menstrual cycle. Denies any abdominal pain or cramping. Denies concern for STDs/STI. Denies urinary symptoms. Denies fevers. States she is otherwise asymptomatic and in his usual state of health. Nursing Notes were all reviewed and agreed with or any disagreements were addressed in the HPI. REVIEW OF SYSTEMS      Review of Systems   All other systems reviewed and are negative. Positives and Pertinent negatives as per HPI.     PAST HISTORY     Past Medical History:  Past Medical History:   Diagnosis Date    ADHD     Anxiety     Asthma     Depression     Eczema     Ran out of her Rx    Molluscum contagiosum 3/2015    on biopsy, inner left thigh    Scoliosis     Screening for malignant neoplasm of the cervix 2013    Negative per pt. @ [address]       Past Surgical History:  Past Surgical History:   Procedure Laterality Date    BREAST REDUCTION SURGERY Bilateral 9/13/2017    BILATERAL BREAST REDUCTION POSSIBLE FREE NIPPLE GRAFT performed by Jean Cannon MD at [facility] MAIN OR       Family History:  Family History   Problem Relation Age of Onset    Diabetes Paternal Uncle     Diabetes Maternal Grandfather     Heart Disease Maternal

## 2024-07-24 ENCOUNTER — TELEPHONE (OUTPATIENT)
Age: 29
End: 2024-07-24

## 2024-07-24 NOTE — TELEPHONE ENCOUNTER
PSR attempted twice to call the patient to obtain the reason for their new patient appointment, however it only rings busy.

## 2024-08-26 ENCOUNTER — HOSPITAL ENCOUNTER (EMERGENCY)
Facility: HOSPITAL | Age: 29
Discharge: HOME | End: 2024-08-26
Payer: COMMERCIAL

## 2024-08-26 VITALS
HEIGHT: 66 IN | SYSTOLIC BLOOD PRESSURE: 117 MMHG | TEMPERATURE: 98.1 F | WEIGHT: 200 LBS | OXYGEN SATURATION: 100 % | BODY MASS INDEX: 32.14 KG/M2 | HEART RATE: 93 BPM | DIASTOLIC BLOOD PRESSURE: 87 MMHG | RESPIRATION RATE: 16 BRPM

## 2024-08-26 DIAGNOSIS — Z20.2 POSSIBLE EXPOSURE TO STD: Primary | ICD-10-CM

## 2024-08-26 DIAGNOSIS — N89.8 VAGINAL DISCHARGE: ICD-10-CM

## 2024-08-26 LAB
APPEARANCE UR: CLEAR
BACTERIA URNS QL MICRO: ABNORMAL /HPF
BILIRUB UR QL: NEGATIVE
CLUE CELLS VAG QL WET PREP: NORMAL
COLOR UR: ABNORMAL
EPITH CASTS URNS QL MICRO: ABNORMAL /LPF
GLUCOSE UR STRIP.AUTO-MCNC: NEGATIVE MG/DL
HCG UR QL: NEGATIVE
HGB UR QL STRIP: NEGATIVE
HYALINE CASTS URNS QL MICRO: ABNORMAL /LPF (ref 0–2)
KETONES UR QL STRIP.AUTO: NEGATIVE MG/DL
LEUKOCYTE ESTERASE UR QL STRIP.AUTO: NEGATIVE
NITRITE UR QL STRIP.AUTO: NEGATIVE
PH UR STRIP: 6.5 (ref 5–8)
PROT UR STRIP-MCNC: ABNORMAL MG/DL
RBC #/AREA URNS HPF: ABNORMAL /HPF (ref 0–5)
SP GR UR REFRACTOMETRY: 1.02 (ref 1–1.03)
T VAGINALIS VAG QL WET PREP: NORMAL
URINE CULTURE IF INDICATED: ABNORMAL
UROBILINOGEN UR QL STRIP.AUTO: 1 EU/DL (ref 0.2–1)
WBC URNS QL MICRO: ABNORMAL /HPF (ref 0–4)
YEAST: NORMAL

## 2024-08-26 PROCEDURE — 6360000002 HC RX W HCPCS

## 2024-08-26 PROCEDURE — 96372 THER/PROPH/DIAG INJ SC/IM: CPT

## 2024-08-26 PROCEDURE — 87210 SMEAR WET MOUNT SALINE/INK: CPT

## 2024-08-26 PROCEDURE — 81025 URINE PREGNANCY TEST: CPT

## 2024-08-26 PROCEDURE — 99284 EMERGENCY DEPT VISIT MOD MDM: CPT

## 2024-08-26 PROCEDURE — 81001 URINALYSIS AUTO W/SCOPE: CPT

## 2024-08-26 PROCEDURE — 6370000000 HC RX 637 (ALT 250 FOR IP)

## 2024-08-26 PROCEDURE — 87491 CHLMYD TRACH DNA AMP PROBE: CPT

## 2024-08-26 PROCEDURE — 87591 N.GONORRHOEAE DNA AMP PROB: CPT

## 2024-08-26 PROCEDURE — 2500000003 HC RX 250 WO HCPCS

## 2024-08-26 RX ORDER — AZITHROMYCIN 250 MG/1
1000 TABLET, FILM COATED ORAL ONCE
Status: COMPLETED | OUTPATIENT
Start: 2024-08-26 | End: 2024-08-26

## 2024-08-26 RX ORDER — ACETAMINOPHEN 500 MG
1000 TABLET ORAL
Status: DISCONTINUED | OUTPATIENT
Start: 2024-08-26 | End: 2024-08-26 | Stop reason: HOSPADM

## 2024-08-26 RX ORDER — FLUCONAZOLE 150 MG/1
150 TABLET ORAL ONCE
Qty: 1 TABLET | Refills: 0 | Status: SHIPPED | OUTPATIENT
Start: 2024-08-26 | End: 2024-08-26

## 2024-08-26 RX ADMIN — LIDOCAINE HYDROCHLORIDE 500 MG: 10 INJECTION, SOLUTION EPIDURAL; INFILTRATION; INTRACAUDAL; PERINEURAL at 20:53

## 2024-08-26 RX ADMIN — AZITHROMYCIN 1000 MG: 250 TABLET, FILM COATED ORAL at 21:08

## 2024-08-26 ASSESSMENT — PAIN SCALES - GENERAL: PAINLEVEL_OUTOF10: 7

## 2024-08-27 NOTE — DISCHARGE INSTRUCTIONS
Please make sure we have your correct number.   1) If your gonorrhea or chlamydia tests are positive and you have not been treated, we will give you a call. If you have been treated in the Emergency department, you will receive a letter with the results.    2) You need to follow up for further STD testing such as HIV, Hepatitis C and syphilis as there is a chance you could have contracted this and we do not test for this in ED. Follow up with the health department for further testing (see locations below).    3) Condoms reduce the risk of catching a sexual related disease.  Please make sure to have protected sex at all times.     For any future STD testing, please go to any of these locations for testing and treatment:    [facility]  [address] [place] [address]  [phone] [facility]  [address] [place] [address]  [phone]   [facility]  [address] [place] [address]  [phone] [facility]  [address] [place] [address]  [phone]   [facility]  [address] [place] [address]  [phone] [facility]  [address] [place] [address]  [phone]   [facility]  [address] [place] [address]  [phone] [facility]  [address] [place] [address]  [phone]          [facility] Health Departments     For adult and child immunizations, family planning, TB screening, STD testing and women's health services.   [facility]: [facility] [phone]      [facility] [phone]    [facility]   [phone]    [facility]   [phone]    [place]   [phone]    [facility]: [place] [phone]      [place] [phone]      [place] [phone]

## 2024-08-29 NOTE — ED PROVIDER NOTES
well-appearing on exam, NAD.  Vital signs as above.  Abdomen soft, nontender, physical exam unremarkable.  Urinalysis does not demonstrate UTI, wet prep negative for clue cells, trichomoniasis, and yeast.  Discussed risk, benefits, terms unexpected outcomes of prophylactic treatment of chlamydia/gonorrhea today.  Patient expresses desire for prophylactic treatment, provided.  Encouraged follow-up with primary care or OB/GYN.  Discharged.             FINAL IMPRESSION     1. Possible exposure to STD    2. Vaginal discharge          DISPOSITION/PLAN   DISPOSITION Decision To Discharge 08/26/2024 09:16:11 PM  Condition at Disposition: Data Unavailable        Care plan outlined and precautions discussed.  Patient has no new complaints, changes, or physical findings.   All of pt's questions and concerns were addressed. Patient was instructed and agrees to follow up with PCP or OB/GYN, as well as to return to the ED upon further deterioration. Patient is ready to go home.      PATIENT REFERRED TO:  Lisa Wiley MD  [address] [facility] [address] [phone]    Schedule an appointment as soon as possible for a visit       [facility] EMERGENCY DEPT  [address] [place] [address]  [phone]    If symptoms worsen       DISCHARGE MEDICATIONS:     Medication List        ASK your doctor about these medications      Adderall XR 15 MG capsule  Generic drug: amphetamine-dextroamphetamine     albuterol sulfate  (90 Base) MCG/ACT inhaler  Commonly known as: PROVENTIL;VENTOLIN;PROAIR     cetirizine 10 MG tablet  Commonly known as: ZYRTEC     ciprofloxacin 250 MG tablet  Commonly known as: CIPRO     clobetasol propionate 0.05 % Lotn lotion     clonazePAM 0.5 MG tablet  Commonly known as: KLONOPIN     cyclobenzaprine 10 MG tablet  Commonly known as: FLEXERIL     Descovy 200-25 MG Tabs tablet  Generic drug: emtricitabine-tenofovir alafenamide     dicyclomine 20 MG tablet  Commonly known as:  BENTYL  Take 1 tablet by mouth 4 times daily as needed (abd cramping)     fluconazole 150 MG tablet  Commonly known as: DIFLUCAN  Take 1 tablet by mouth once for 1 dose  Ask about: Should I take this medication?     FLUoxetine 20 MG capsule  Commonly known as: PROZAC     hydroCHLOROthiazide 50 MG tablet  Commonly known as: HYDRODIURIL     hydrOXYzine pamoate 50 MG capsule  Commonly known as: VISTARIL     indomethacin 50 MG capsule  Commonly known as: INDOCIN  Take 1 capsule by mouth 2 times daily (with meals)     ivermectin 3 MG tablet     ketoconazole 2 % shampoo  Commonly known as: NIZORAL     lamoTRIgine 100 MG tablet  Commonly known as: LAMICTAL     LORazepam 1 MG tablet  Commonly known as: ATIVAN     montelukast 10 MG tablet  Commonly known as: SINGULAIR     mupirocin 2 % ointment  Commonly known as: BACTROBAN     ondansetron 4 MG disintegrating tablet  Commonly known as: ZOFRAN-ODT     promethazine-dextromethorphan 6.25-15 MG/5ML syrup  Commonly known as: PROMETHAZINE-DM     QUEtiapine 25 MG tablet  Commonly known as: SEROQUEL     topiramate ER 50 MG Cp24  Commonly known as: TROKENDI XR     tretinoin 0.025 % cream  Commonly known as: RETIN-A     triamcinolone 0.5 % ointment  Commonly known as: ARISTOCORT  Apply topically 3 times daily     vitamin D [address]131 UNIT Caps  Commonly known as: CHOLECALCIFEROL               Where to Get Your Medications        These medications were sent to [facility]/pharmacy #[id number] - [place], [place] - [address] - P [phone] - F [phone]  [address] [facility] [address]      Hours: 24-hours Phone: [phone]   fluconazole 150 MG tablet           DISCONTINUED MEDICATIONS:  Discharge Medication List as of 8/26/2024  8:27 PM          I have seen and evaluated the patient autonomously. My supervision physician was on site and available for consultation if needed.     I am the Primary Clinician of Record.   Jossy Fontenot PA-C (electronically

## 2024-09-27 ENCOUNTER — HOSPITAL ENCOUNTER (OUTPATIENT)
Facility: HOSPITAL | Age: 29
Setting detail: SPECIMEN
Discharge: HOME | End: 2024-09-30

## (undated) DEVICE — STERILE POLYISOPRENE POWDER-FREE SURGICAL GLOVES: Brand: PROTEXIS

## (undated) DEVICE — STERILE POLYISOPRENE POWDER-FREE SURGICAL GLOVES WITH EMOLLIENT COATING: Brand: PROTEXIS

## (undated) DEVICE — REM POLYHESIVE ADULT PATIENT RETURN ELECTRODE: Brand: VALLEYLAB

## (undated) DEVICE — SPONGE LAP 18X18IN STRL -- 5/PK

## (undated) DEVICE — MEDI-VAC NON-CONDUCTIVE SUCTION TUBING: Brand: CARDINAL HEALTH

## (undated) DEVICE — TRAY CATH SIL 16FR 10 CA STATLOK

## (undated) DEVICE — DEVON™ KNEE AND BODY STRAP 60" X 3" (1.5 M X 7.6 CM): Brand: DEVON

## (undated) DEVICE — SUTURE MCRYL SZ 2-0 L27IN ABSRB UD SH L26MM TAPERPOINT NDL Y417H

## (undated) DEVICE — SOLUTION IV 1000ML 0.9% SOD CHL

## (undated) DEVICE — TUBING SUCT L9FT FOR AUTOFUSE INFLTR SYS

## (undated) DEVICE — GAUZE SPONGES,12 PLY: Brand: CURITY

## (undated) DEVICE — SUTURE VCRL SZ 0 L27IN ABSRB UD SH L26MM 1/2 CIR TAPERPOINT J418H

## (undated) DEVICE — DERMABOND SKIN ADH 0.7ML -- DERMABOND ADVANCED 12/BX

## (undated) DEVICE — DRAPE,CHEST,FENES,15X10,STERIL: Brand: MEDLINE

## (undated) DEVICE — 3M™ MEDIPORE™ H SOFT CLOTH TAPE SHORT ROLL TAPE 6INCHES X 2 YARDS 16 ROLLS/CASE 2866S: Brand: 3M™ MEDIPORE™

## (undated) DEVICE — DRAPE FLD WRM W44XL66IN C6L FOR INTRATEMP SYS THERMABASIN

## (undated) DEVICE — TRAY PREP DRY W/ PREM GLV 2 APPL 6 SPNG 2 UNDPD 1 OVERWRAP

## (undated) DEVICE — 3M™ BAIR HUGGER® UNDERBODY BLANKET, FULL ACCESS, 10 PER CASE 63500: Brand: BAIR HUGGER™

## (undated) DEVICE — KENDALL SCD EXPRESS SLEEVES, KNEE LENGTH, MEDIUM: Brand: KENDALL SCD

## (undated) DEVICE — SOLUTION LACTATED RINGERS INJECTION USP

## (undated) DEVICE — Z DISCONTINUED PER MEDLINE PACK PROCEDURE SURG PLAS

## (undated) DEVICE — 3000CC GUARDIAN II: Brand: GUARDIAN

## (undated) DEVICE — BANDAGE COMPR SELF ADH 5 YDX4 IN TAN STRL PREMIERPRO LF

## (undated) DEVICE — INFECTION CONTROL KIT SYS

## (undated) DEVICE — Z DISCONTINUEDSOLUTION PREP 2OZ 10% POVIDONE IOD SCR CAP BTL